# Patient Record
Sex: MALE | Race: OTHER | Employment: FULL TIME | URBAN - METROPOLITAN AREA
[De-identification: names, ages, dates, MRNs, and addresses within clinical notes are randomized per-mention and may not be internally consistent; named-entity substitution may affect disease eponyms.]

---

## 2017-07-27 ENCOUNTER — OFFICE VISIT (OUTPATIENT)
Dept: INTERNAL MEDICINE CLINIC | Age: 57
End: 2017-07-27

## 2017-07-27 VITALS
OXYGEN SATURATION: 97 % | SYSTOLIC BLOOD PRESSURE: 116 MMHG | RESPIRATION RATE: 16 BRPM | HEIGHT: 64 IN | BODY MASS INDEX: 39.27 KG/M2 | TEMPERATURE: 98.7 F | DIASTOLIC BLOOD PRESSURE: 80 MMHG | WEIGHT: 230 LBS | HEART RATE: 105 BPM

## 2017-07-27 DIAGNOSIS — K75.81 NASH (NONALCOHOLIC STEATOHEPATITIS): ICD-10-CM

## 2017-07-27 DIAGNOSIS — E78.2 MIXED HYPERLIPIDEMIA: ICD-10-CM

## 2017-07-27 DIAGNOSIS — M79.672 PAIN OF BOTH HEELS: ICD-10-CM

## 2017-07-27 DIAGNOSIS — Z13.9 SCREENING: ICD-10-CM

## 2017-07-27 DIAGNOSIS — Z23 NEED FOR TDAP VACCINATION: ICD-10-CM

## 2017-07-27 DIAGNOSIS — M72.2 PLANTAR FASCIITIS, BILATERAL: ICD-10-CM

## 2017-07-27 DIAGNOSIS — M79.671 PAIN OF BOTH HEELS: ICD-10-CM

## 2017-07-27 DIAGNOSIS — M32.9 LUPUS (SYSTEMIC LUPUS ERYTHEMATOSUS) (HCC): Primary | ICD-10-CM

## 2017-07-27 DIAGNOSIS — N52.9 ERECTILE DYSFUNCTION, UNSPECIFIED ERECTILE DYSFUNCTION TYPE: ICD-10-CM

## 2017-07-27 LAB
A/G RATIO: 1.3 (ref 1.1–2.2)
ALBUMIN SERPL-MCNC: 4.4 G/DL (ref 3.4–5)
ALP BLD-CCNC: 103 U/L (ref 40–129)
ALT SERPL-CCNC: 158 U/L (ref 10–40)
ANION GAP SERPL CALCULATED.3IONS-SCNC: 17 MMOL/L (ref 3–16)
AST SERPL-CCNC: 140 U/L (ref 15–37)
BILIRUB SERPL-MCNC: 1 MG/DL (ref 0–1)
BUN BLDV-MCNC: 22 MG/DL (ref 7–20)
CALCIUM SERPL-MCNC: 10.7 MG/DL (ref 8.3–10.6)
CHLORIDE BLD-SCNC: 99 MMOL/L (ref 99–110)
CHOLESTEROL, TOTAL: 200 MG/DL (ref 0–199)
CO2: 22 MMOL/L (ref 21–32)
CREAT SERPL-MCNC: 1.6 MG/DL (ref 0.9–1.3)
GFR AFRICAN AMERICAN: 54
GFR NON-AFRICAN AMERICAN: 45
GLOBULIN: 3.4 G/DL
GLUCOSE BLD-MCNC: 86 MG/DL (ref 70–99)
HDLC SERPL-MCNC: 32 MG/DL (ref 40–60)
HEPATITIS C ANTIBODY INTERPRETATION: NORMAL
LDL CHOLESTEROL CALCULATED: 140 MG/DL
POTASSIUM SERPL-SCNC: 4.7 MMOL/L (ref 3.5–5.1)
SODIUM BLD-SCNC: 138 MMOL/L (ref 136–145)
TOTAL PROTEIN: 7.8 G/DL (ref 6.4–8.2)
TRIGL SERPL-MCNC: 142 MG/DL (ref 0–150)
VLDLC SERPL CALC-MCNC: 28 MG/DL

## 2017-07-27 PROCEDURE — 90471 IMMUNIZATION ADMIN: CPT | Performed by: INTERNAL MEDICINE

## 2017-07-27 PROCEDURE — 90715 TDAP VACCINE 7 YRS/> IM: CPT | Performed by: INTERNAL MEDICINE

## 2017-07-27 PROCEDURE — 99214 OFFICE O/P EST MOD 30 MIN: CPT | Performed by: INTERNAL MEDICINE

## 2017-07-27 RX ORDER — SILDENAFIL 100 MG/1
100 TABLET, FILM COATED ORAL DAILY PRN
Qty: 15 TABLET | Refills: 11 | Status: SHIPPED | OUTPATIENT
Start: 2017-07-27

## 2017-07-27 ASSESSMENT — PATIENT HEALTH QUESTIONNAIRE - PHQ9
2. FEELING DOWN, DEPRESSED OR HOPELESS: 0
SUM OF ALL RESPONSES TO PHQ9 QUESTIONS 1 & 2: 0
1. LITTLE INTEREST OR PLEASURE IN DOING THINGS: 0
SUM OF ALL RESPONSES TO PHQ QUESTIONS 1-9: 0

## 2017-07-28 DIAGNOSIS — M32.9 LUPUS (SYSTEMIC LUPUS ERYTHEMATOSUS) (HCC): ICD-10-CM

## 2017-07-28 DIAGNOSIS — N17.9 ACUTE KIDNEY INJURY (HCC): Primary | ICD-10-CM

## 2017-07-28 LAB — HIV-1 AND HIV-2 ANTIBODIES: NORMAL

## 2017-08-08 ENCOUNTER — HOSPITAL ENCOUNTER (OUTPATIENT)
Dept: ULTRASOUND IMAGING | Age: 57
Discharge: HOME | End: 2017-08-08
Attending: INTERNAL MEDICINE | Admitting: INTERNAL MEDICINE

## 2017-08-08 DIAGNOSIS — N17.9 ACUTE RENAL FAILURE, UNSPECIFIED ACUTE RENAL FAILURE TYPE (HCC): ICD-10-CM

## 2017-08-08 DIAGNOSIS — N17.9 ACUTE KIDNEY FAILURE (HCC): ICD-10-CM

## 2021-03-25 ENCOUNTER — IMMUNIZATION (OUTPATIENT)
Dept: PRIMARY CARE CLINIC | Age: 61
End: 2021-03-25
Payer: COMMERCIAL

## 2021-03-25 PROCEDURE — 91301 COVID-19, MODERNA VACCINE 100MCG/0.5ML DOSE: CPT | Performed by: FAMILY MEDICINE

## 2021-03-25 PROCEDURE — 0011A PR IMM ADMN SARSCOV2 100 MCG/0.5 ML 1ST DOSE: CPT | Performed by: FAMILY MEDICINE

## 2021-04-22 ENCOUNTER — IMMUNIZATION (OUTPATIENT)
Dept: PRIMARY CARE CLINIC | Age: 61
End: 2021-04-22
Payer: COMMERCIAL

## 2021-04-22 PROCEDURE — 0012A PR IMM ADMN SARSCOV2 100 MCG/0.5 ML 2ND DOSE: CPT | Performed by: FAMILY MEDICINE

## 2021-04-22 PROCEDURE — 91301 COVID-19, MODERNA VACCINE 100MCG/0.5ML DOSE: CPT | Performed by: FAMILY MEDICINE

## 2024-12-23 SDOH — HEALTH STABILITY: PHYSICAL HEALTH
ON AVERAGE, HOW MANY DAYS PER WEEK DO YOU ENGAGE IN MODERATE TO STRENUOUS EXERCISE (LIKE A BRISK WALK)?: PATIENT DECLINED

## 2024-12-26 ENCOUNTER — OFFICE VISIT (OUTPATIENT)
Dept: FAMILY MEDICINE CLINIC | Age: 64
End: 2024-12-26
Payer: COMMERCIAL

## 2024-12-26 ENCOUNTER — HOSPITAL ENCOUNTER (OUTPATIENT)
Dept: CT IMAGING | Age: 64
Discharge: HOME | End: 2024-12-26
Payer: COMMERCIAL

## 2024-12-26 VITALS
SYSTOLIC BLOOD PRESSURE: 122 MMHG | HEIGHT: 64 IN | HEART RATE: 114 BPM | WEIGHT: 214.8 LBS | TEMPERATURE: 98.2 F | DIASTOLIC BLOOD PRESSURE: 80 MMHG | OXYGEN SATURATION: 98 % | BODY MASS INDEX: 36.67 KG/M2

## 2024-12-26 DIAGNOSIS — I81 PVT (PORTAL VEIN THROMBOSIS): ICD-10-CM

## 2024-12-26 DIAGNOSIS — K76.6 PORTAL VENOUS HYPERTENSION (HCC): ICD-10-CM

## 2024-12-26 DIAGNOSIS — R19.8 IRREGULAR BOWEL HABITS: ICD-10-CM

## 2024-12-26 DIAGNOSIS — E78.2 MIXED HYPERLIPIDEMIA: ICD-10-CM

## 2024-12-26 DIAGNOSIS — R14.0 ABDOMINAL BLOATING: ICD-10-CM

## 2024-12-26 DIAGNOSIS — M32.9 HISTORY OF SYSTEMIC LUPUS ERYTHEMATOSUS (SLE) (HCC): ICD-10-CM

## 2024-12-26 DIAGNOSIS — Z12.5 PROSTATE CANCER SCREENING: ICD-10-CM

## 2024-12-26 DIAGNOSIS — K75.81 NASH (NONALCOHOLIC STEATOHEPATITIS): ICD-10-CM

## 2024-12-26 DIAGNOSIS — R18.8 CIRRHOSIS OF LIVER WITH ASCITES, UNSPECIFIED HEPATIC CIRRHOSIS TYPE (HCC): Primary | ICD-10-CM

## 2024-12-26 DIAGNOSIS — R00.0 TACHYCARDIA: ICD-10-CM

## 2024-12-26 DIAGNOSIS — R10.84 GENERALIZED ABDOMINAL PAIN: ICD-10-CM

## 2024-12-26 DIAGNOSIS — R63.4 UNINTENDED WEIGHT LOSS: ICD-10-CM

## 2024-12-26 DIAGNOSIS — K92.1 BLACK STOOLS: ICD-10-CM

## 2024-12-26 DIAGNOSIS — K74.60 CIRRHOSIS OF LIVER WITH ASCITES, UNSPECIFIED HEPATIC CIRRHOSIS TYPE (HCC): Primary | ICD-10-CM

## 2024-12-26 DIAGNOSIS — R10.84 GENERALIZED ABDOMINAL PAIN: Primary | ICD-10-CM

## 2024-12-26 LAB
ALBUMIN SERPL-MCNC: 3 G/DL (ref 3.4–5)
ALBUMIN/GLOB SERPL: 0.8 {RATIO} (ref 1.1–2.2)
ALP SERPL-CCNC: 758 U/L (ref 40–129)
ALT SERPL-CCNC: 108 U/L (ref 10–40)
ANION GAP SERPL CALCULATED.3IONS-SCNC: 11 MMOL/L (ref 3–16)
APTT BLD: 34 SEC (ref 22.1–36.4)
AST SERPL-CCNC: 228 U/L (ref 15–37)
BASOPHILS # BLD: 0.1 K/UL (ref 0–0.2)
BASOPHILS NFR BLD: 1.3 %
BILIRUB SERPL-MCNC: 3.7 MG/DL (ref 0–1)
BUN SERPL-MCNC: 22 MG/DL (ref 7–20)
CALCIUM SERPL-MCNC: 9.8 MG/DL (ref 8.3–10.6)
CHLORIDE SERPL-SCNC: 104 MMOL/L (ref 99–110)
CHOLEST SERPL-MCNC: 194 MG/DL (ref 0–199)
CO2 SERPL-SCNC: 24 MMOL/L (ref 21–32)
CREAT SERPL-MCNC: 0.9 MG/DL (ref 0.8–1.3)
CRP SERPL-MCNC: 21.3 MG/L (ref 0–5.1)
DEPRECATED RDW RBC AUTO: 17.8 % (ref 12.4–15.4)
EOSINOPHIL # BLD: 0.1 K/UL (ref 0–0.6)
EOSINOPHIL NFR BLD: 1.9 %
ERYTHROCYTE [SEDIMENTATION RATE] IN BLOOD BY WESTERGREN METHOD: 96 MM/HR (ref 0–20)
EST. AVERAGE GLUCOSE BLD GHB EST-MCNC: 111.2 MG/DL
GFR SERPLBLD CREATININE-BSD FMLA CKD-EPI: >90 ML/MIN/{1.73_M2}
GLUCOSE SERPL-MCNC: 123 MG/DL (ref 70–99)
HBA1C MFR BLD: 5.5 %
HCT VFR BLD AUTO: 36.9 % (ref 40.5–52.5)
HDLC SERPL-MCNC: 17 MG/DL (ref 40–60)
HGB BLD-MCNC: 12.4 G/DL (ref 13.5–17.5)
INR PPP: 1.21 (ref 0.85–1.15)
LDLC SERPL CALC-MCNC: 145 MG/DL
LYMPHOCYTES # BLD: 1.5 K/UL (ref 1–5.1)
LYMPHOCYTES NFR BLD: 22.3 %
MCH RBC QN AUTO: 31.3 PG (ref 26–34)
MCHC RBC AUTO-ENTMCNC: 33.5 G/DL (ref 31–36)
MCV RBC AUTO: 93.4 FL (ref 80–100)
MONOCYTES # BLD: 0.5 K/UL (ref 0–1.3)
MONOCYTES NFR BLD: 6.9 %
NEUTROPHILS # BLD: 4.6 K/UL (ref 1.7–7.7)
NEUTROPHILS NFR BLD: 67.6 %
PERFORMED ON: NORMAL
PLATELET # BLD AUTO: 247 K/UL (ref 135–450)
PMV BLD AUTO: 9.9 FL (ref 5–10.5)
POC CREATININE: 1 MG/DL (ref 0.8–1.3)
POC SAMPLE TYPE: NORMAL
POTASSIUM SERPL-SCNC: 4.8 MMOL/L (ref 3.5–5.1)
PROT SERPL-MCNC: 7 G/DL (ref 6.4–8.2)
PROTHROMBIN TIME: 15.5 SEC (ref 11.9–14.9)
PSA SERPL DL<=0.01 NG/ML-MCNC: 4.12 NG/ML (ref 0–4)
RBC # BLD AUTO: 3.95 M/UL (ref 4.2–5.9)
SODIUM SERPL-SCNC: 139 MMOL/L (ref 136–145)
TRIGL SERPL-MCNC: 160 MG/DL (ref 0–150)
TSH SERPL DL<=0.005 MIU/L-ACNC: 2.78 UIU/ML (ref 0.27–4.2)
VLDLC SERPL CALC-MCNC: 32 MG/DL
WBC # BLD AUTO: 6.9 K/UL (ref 4–11)

## 2024-12-26 PROCEDURE — 99204 OFFICE O/P NEW MOD 45 MIN: CPT | Performed by: FAMILY MEDICINE

## 2024-12-26 PROCEDURE — 6360000004 HC RX CONTRAST MEDICATION: Performed by: FAMILY MEDICINE

## 2024-12-26 PROCEDURE — 82565 ASSAY OF CREATININE: CPT

## 2024-12-26 PROCEDURE — 93000 ELECTROCARDIOGRAM COMPLETE: CPT | Performed by: FAMILY MEDICINE

## 2024-12-26 PROCEDURE — 74177 CT ABD & PELVIS W/CONTRAST: CPT

## 2024-12-26 RX ORDER — IOPAMIDOL 755 MG/ML
75 INJECTION, SOLUTION INTRAVASCULAR
Status: COMPLETED | OUTPATIENT
Start: 2024-12-26 | End: 2024-12-26

## 2024-12-26 RX ADMIN — IOPAMIDOL 75 ML: 755 INJECTION, SOLUTION INTRAVENOUS at 11:02

## 2024-12-26 ASSESSMENT — PATIENT HEALTH QUESTIONNAIRE - PHQ9
9. THOUGHTS THAT YOU WOULD BE BETTER OFF DEAD, OR OF HURTING YOURSELF: NOT AT ALL
5. POOR APPETITE OR OVEREATING: NEARLY EVERY DAY
4. FEELING TIRED OR HAVING LITTLE ENERGY: SEVERAL DAYS
SUM OF ALL RESPONSES TO PHQ9 QUESTIONS 1 & 2: 2
SUM OF ALL RESPONSES TO PHQ QUESTIONS 1-9: 9
3. TROUBLE FALLING OR STAYING ASLEEP: MORE THAN HALF THE DAYS
SUM OF ALL RESPONSES TO PHQ QUESTIONS 1-9: 9
8. MOVING OR SPEAKING SO SLOWLY THAT OTHER PEOPLE COULD HAVE NOTICED. OR THE OPPOSITE, BEING SO FIGETY OR RESTLESS THAT YOU HAVE BEEN MOVING AROUND A LOT MORE THAN USUAL: NOT AT ALL
SUM OF ALL RESPONSES TO PHQ QUESTIONS 1-9: 9
SUM OF ALL RESPONSES TO PHQ QUESTIONS 1-9: 9
7. TROUBLE CONCENTRATING ON THINGS, SUCH AS READING THE NEWSPAPER OR WATCHING TELEVISION: NOT AT ALL
2. FEELING DOWN, DEPRESSED OR HOPELESS: SEVERAL DAYS
10. IF YOU CHECKED OFF ANY PROBLEMS, HOW DIFFICULT HAVE THESE PROBLEMS MADE IT FOR YOU TO DO YOUR WORK, TAKE CARE OF THINGS AT HOME, OR GET ALONG WITH OTHER PEOPLE: SOMEWHAT DIFFICULT
6. FEELING BAD ABOUT YOURSELF - OR THAT YOU ARE A FAILURE OR HAVE LET YOURSELF OR YOUR FAMILY DOWN: SEVERAL DAYS
1. LITTLE INTEREST OR PLEASURE IN DOING THINGS: SEVERAL DAYS

## 2024-12-26 ASSESSMENT — ANXIETY QUESTIONNAIRES
5. BEING SO RESTLESS THAT IT IS HARD TO SIT STILL: SEVERAL DAYS
4. TROUBLE RELAXING: SEVERAL DAYS
6. BECOMING EASILY ANNOYED OR IRRITABLE: SEVERAL DAYS
7. FEELING AFRAID AS IF SOMETHING AWFUL MIGHT HAPPEN: NOT AT ALL
GAD7 TOTAL SCORE: 6
IF YOU CHECKED OFF ANY PROBLEMS ON THIS QUESTIONNAIRE, HOW DIFFICULT HAVE THESE PROBLEMS MADE IT FOR YOU TO DO YOUR WORK, TAKE CARE OF THINGS AT HOME, OR GET ALONG WITH OTHER PEOPLE: SOMEWHAT DIFFICULT
2. NOT BEING ABLE TO STOP OR CONTROL WORRYING: SEVERAL DAYS
3. WORRYING TOO MUCH ABOUT DIFFERENT THINGS: SEVERAL DAYS
1. FEELING NERVOUS, ANXIOUS, OR ON EDGE: SEVERAL DAYS

## 2024-12-26 NOTE — PROGRESS NOTES
[facility]  Date of Encounter: 2024     Geraldo Teresa (: 1960) is a 64 y.o. male who presents today for:   Chief Complaint   Patient presents with    New Patient    Establish Care     Pt relocated from the [place] [facility] to the [place] [facility] and needed to establish care     Establishing care today.    About 2-3 months ago (mid October)- developed irregular bowel movements (potent smell and dark color) and abdominal discomfort. Then developed bloating which has been more persistent. Has had to cut back on when and how much he is eating. BMs have become more regular (BM every 2-3 days without straining), although did have 2 days of black stools about 1 month ago.   Over the last week with waxing and waning LE swelling, especially to feet.   Does not feel like urination volume matches with intake, still urinating without other urinary symptoms.     Weight 231 lbs prior to onset of symptoms, now 215 lbs.     SLE dx in - was prescribed plaquenil but has not had symptoms since then and has not taken any medication for SLE for 30+ years (only took for about 2 years).     Occasionally taking tylenol prn.     Has noticed some apathy and anhedonia related to abdominal bloating and discomfort limiting his activity.  Mild sensation of SOB, feels like he cannot take a deep breath with bloating.   ROS otherwise negative.           2024     9:54 AM 2017     3:29 PM 3/18/2015     4:22 PM   PHQ-9    Little interest or pleasure in doing things 1 0 0   Feeling down, depressed, or hopeless 1 0 0   Trouble falling or staying asleep, or sleeping too much 2     Feeling tired or having little energy 1     Poor appetite or overeating 3     Feeling bad about yourself - or that you are a failure or have let yourself or your family down 1     Trouble concentrating on things, such as reading the newspaper or watching television 0     Moving or speaking so slowly that other people

## 2024-12-27 LAB
ANA SER QL IA: NEGATIVE
BACTERIA URNS QL MICRO: ABNORMAL /HPF
BILIRUB UR QL STRIP.AUTO: ABNORMAL
CLARITY UR: ABNORMAL
COLOR UR: ABNORMAL
CREAT UR-MCNC: 445 MG/DL (ref 39–259)
CRYSTALS URNS MICRO: ABNORMAL /HPF
DSDNA AB SER-ACNC: <1 IU/ML (ref 0–9)
EPI CELLS #/AREA URNS HPF: ABNORMAL /HPF (ref 0–5)
GLUCOSE UR STRIP.AUTO-MCNC: ABNORMAL MG/DL
HGB UR QL STRIP.AUTO: ABNORMAL
HYALINE CASTS #/AREA URNS LPF: ABNORMAL /LPF (ref 0–2)
KETONES UR STRIP.AUTO-MCNC: ABNORMAL MG/DL
LEUKOCYTE ESTERASE UR QL STRIP.AUTO: ABNORMAL
MICROALBUMIN UR DL<=1MG/L-MCNC: 4.32 MG/DL
MICROALBUMIN/CREAT UR: 9.7 MG/G (ref 0–30)
NITRITE UR QL STRIP.AUTO: ABNORMAL
PH UR STRIP.AUTO: ABNORMAL [PH] (ref 5–8)
PROT UR STRIP.AUTO-MCNC: ABNORMAL MG/DL
RBC #/AREA URNS HPF: ABNORMAL /HPF (ref 0–4)
SP GR UR STRIP.AUTO: ABNORMAL (ref 1–1.03)
UA DIPSTICK W REFLEX MICRO PNL UR: YES
URN SPEC COLLECT METH UR: ABNORMAL
UROBILINOGEN UR STRIP-ACNC: ABNORMAL E.U./DL
WBC #/AREA URNS HPF: ABNORMAL /HPF (ref 0–5)

## 2024-12-30 DIAGNOSIS — R18.8 CIRRHOSIS OF LIVER WITH ASCITES, UNSPECIFIED HEPATIC CIRRHOSIS TYPE (HCC): Primary | ICD-10-CM

## 2024-12-30 DIAGNOSIS — K74.60 CIRRHOSIS OF LIVER WITH ASCITES, UNSPECIFIED HEPATIC CIRRHOSIS TYPE (HCC): Primary | ICD-10-CM

## 2024-12-30 RX ORDER — FUROSEMIDE 20 MG/1
20 TABLET ORAL DAILY
Qty: 30 TABLET | Refills: 1 | Status: SHIPPED | OUTPATIENT
Start: 2024-12-30

## 2024-12-30 RX ORDER — SPIRONOLACTONE 50 MG/1
50 TABLET, FILM COATED ORAL DAILY
Qty: 30 TABLET | Refills: 1 | Status: SHIPPED | OUTPATIENT
Start: 2024-12-30

## 2024-12-31 DIAGNOSIS — R18.8 CIRRHOSIS OF LIVER WITH ASCITES, UNSPECIFIED HEPATIC CIRRHOSIS TYPE (HCC): ICD-10-CM

## 2024-12-31 DIAGNOSIS — K74.60 CIRRHOSIS OF LIVER WITH ASCITES, UNSPECIFIED HEPATIC CIRRHOSIS TYPE (HCC): ICD-10-CM

## 2024-12-31 LAB
FERRITIN SERPL IA-MCNC: 501 NG/ML (ref 30–400)
GGT SERPL-CCNC: 761 U/L (ref 8–61)
HAV IGM SERPL QL IA: NORMAL
HBV CORE IGM SERPL QL IA: NORMAL
HBV SURFACE AB SERPL IA-ACNC: 22.9 MIU/ML
HBV SURFACE AG SERPL QL IA: NORMAL
HCV AB SERPL QL IA: NORMAL
IRON SATN MFR SERPL: 43 % (ref 20–50)
IRON SERPL-MCNC: 60 UG/DL (ref 59–158)
TIBC SERPL-MCNC: 140 UG/DL (ref 260–445)

## 2025-01-02 LAB
HBV CORE AB SERPL QL IA: NEGATIVE
LKM-1 IGG SER IA-ACNC: 1.6 U (ref 0–24.9)

## 2025-01-03 LAB
AFP-TM SERPL-MCNC: <1.8 UG/L
SMA IGG SER-ACNC: 16 UNITS (ref 0–19)

## 2025-01-04 LAB — CERULOPLASMIN SERPL-MCNC: 36 MG/DL (ref 15–30)

## 2025-01-06 LAB — MITOCHONDRIA M2 AB SER IA-ACNC: 2.3 U/ML (ref 0–4)

## 2025-01-06 SDOH — ECONOMIC STABILITY: FOOD INSECURITY: WITHIN THE PAST 12 MONTHS, THE FOOD YOU BOUGHT JUST DIDN'T LAST AND YOU DIDN'T HAVE MONEY TO GET MORE.: NEVER TRUE

## 2025-01-06 SDOH — ECONOMIC STABILITY: TRANSPORTATION INSECURITY
IN THE PAST 12 MONTHS, HAS LACK OF TRANSPORTATION KEPT YOU FROM MEETINGS, WORK, OR FROM GETTING THINGS NEEDED FOR DAILY LIVING?: NO

## 2025-01-06 SDOH — ECONOMIC STABILITY: FOOD INSECURITY: WITHIN THE PAST 12 MONTHS, YOU WORRIED THAT YOUR FOOD WOULD RUN OUT BEFORE YOU GOT MONEY TO BUY MORE.: NEVER TRUE

## 2025-01-06 SDOH — ECONOMIC STABILITY: INCOME INSECURITY: HOW HARD IS IT FOR YOU TO PAY FOR THE VERY BASICS LIKE FOOD, HOUSING, MEDICAL CARE, AND HEATING?: NOT HARD AT ALL

## 2025-01-06 ASSESSMENT — PATIENT HEALTH QUESTIONNAIRE - PHQ9
SUM OF ALL RESPONSES TO PHQ9 QUESTIONS 1 & 2: 0
1. LITTLE INTEREST OR PLEASURE IN DOING THINGS: NOT AT ALL
SUM OF ALL RESPONSES TO PHQ QUESTIONS 1-9: 0
1. LITTLE INTEREST OR PLEASURE IN DOING THINGS: NOT AT ALL
SUM OF ALL RESPONSES TO PHQ9 QUESTIONS 1 & 2: 0
2. FEELING DOWN, DEPRESSED OR HOPELESS: NOT AT ALL
SUM OF ALL RESPONSES TO PHQ QUESTIONS 1-9: 0
2. FEELING DOWN, DEPRESSED OR HOPELESS: NOT AT ALL
SUM OF ALL RESPONSES TO PHQ QUESTIONS 1-9: 0
SUM OF ALL RESPONSES TO PHQ QUESTIONS 1-9: 0

## 2025-01-08 ENCOUNTER — HOSPITAL ENCOUNTER (OUTPATIENT)
Dept: MRI IMAGING | Age: 65
Discharge: HOME | End: 2025-01-08
Attending: FAMILY MEDICINE
Payer: COMMERCIAL

## 2025-01-08 DIAGNOSIS — K74.60 CIRRHOSIS OF LIVER WITH ASCITES, UNSPECIFIED HEPATIC CIRRHOSIS TYPE (HCC): ICD-10-CM

## 2025-01-08 DIAGNOSIS — R18.8 CIRRHOSIS OF LIVER WITH ASCITES, UNSPECIFIED HEPATIC CIRRHOSIS TYPE (HCC): ICD-10-CM

## 2025-01-08 PROCEDURE — 74183 MRI ABD W/O CNTR FLWD CNTR: CPT

## 2025-01-08 PROCEDURE — A9581 GADOXETATE DISODIUM INJ: HCPCS | Performed by: FAMILY MEDICINE

## 2025-01-08 PROCEDURE — 6360000004 HC RX CONTRAST MEDICATION: Performed by: FAMILY MEDICINE

## 2025-01-08 RX ADMIN — GADOXETATE DISODIUM 10 ML: 181.43 INJECTION, SOLUTION INTRAVENOUS at 15:12

## 2025-01-09 ENCOUNTER — OFFICE VISIT (OUTPATIENT)
Dept: FAMILY MEDICINE CLINIC | Age: 65
End: 2025-01-09
Payer: COMMERCIAL

## 2025-01-09 VITALS
WEIGHT: 216.4 LBS | HEIGHT: 64 IN | OXYGEN SATURATION: 99 % | DIASTOLIC BLOOD PRESSURE: 70 MMHG | HEART RATE: 100 BPM | BODY MASS INDEX: 36.95 KG/M2 | SYSTOLIC BLOOD PRESSURE: 120 MMHG | TEMPERATURE: 98.2 F

## 2025-01-09 DIAGNOSIS — I81 PVT (PORTAL VEIN THROMBOSIS): ICD-10-CM

## 2025-01-09 DIAGNOSIS — R18.8 CIRRHOSIS OF LIVER WITH ASCITES, UNSPECIFIED HEPATIC CIRRHOSIS TYPE (HCC): ICD-10-CM

## 2025-01-09 DIAGNOSIS — K74.60 CIRRHOSIS OF LIVER WITH ASCITES, UNSPECIFIED HEPATIC CIRRHOSIS TYPE (HCC): ICD-10-CM

## 2025-01-09 DIAGNOSIS — K76.6 PORTAL VENOUS HYPERTENSION (HCC): ICD-10-CM

## 2025-01-09 DIAGNOSIS — C22.0 HEPATOCELLULAR CARCINOMA (HCC): Primary | ICD-10-CM

## 2025-01-09 LAB
ANION GAP SERPL CALCULATED.3IONS-SCNC: 11 MMOL/L (ref 3–16)
BUN SERPL-MCNC: 24 MG/DL (ref 7–20)
CALCIUM SERPL-MCNC: 10.2 MG/DL (ref 8.3–10.6)
CHLORIDE SERPL-SCNC: 95 MMOL/L (ref 99–110)
CO2 SERPL-SCNC: 24 MMOL/L (ref 21–32)
CREAT SERPL-MCNC: 1 MG/DL (ref 0.8–1.3)
GFR SERPLBLD CREATININE-BSD FMLA CKD-EPI: 84 ML/MIN/{1.73_M2}
GLUCOSE SERPL-MCNC: 90 MG/DL (ref 70–99)
POTASSIUM SERPL-SCNC: 5.2 MMOL/L (ref 3.5–5.1)
SODIUM SERPL-SCNC: 130 MMOL/L (ref 136–145)

## 2025-01-09 PROCEDURE — 99214 OFFICE O/P EST MOD 30 MIN: CPT | Performed by: FAMILY MEDICINE

## 2025-01-09 RX ORDER — SPIRONOLACTONE 100 MG/1
100 TABLET, FILM COATED ORAL DAILY
Qty: 30 TABLET | Refills: 0 | Status: SHIPPED | OUTPATIENT
Start: 2025-01-09

## 2025-01-09 RX ORDER — FUROSEMIDE 40 MG/1
40 TABLET ORAL DAILY
Qty: 30 TABLET | Refills: 0 | Status: SHIPPED | OUTPATIENT
Start: 2025-01-09

## 2025-01-09 NOTE — PROGRESS NOTES
Patient to call if unable to get in with oncology within 1 week.     All pertinent side effects, risks, benefits and precautions of medication(s) prescribed during this visit were discussed in detail. Patient understands and agrees with above treatment plan.     Objective   /70 (Site: Right Upper Arm, Position: Sitting, Cuff Size: Medium Adult)   Pulse 100   Temp 98.2 °F (36.8 °C) (Oral)   Ht 1.626 m (5' 4\")   Wt 98.2 kg (216 lb 6.4 oz)   SpO2 99%   BMI 37.14 kg/m²    Physical Exam  Constitutional:       General: He is not in acute distress.  HENT:      Head: Normocephalic and atraumatic.   Eyes:      Extraocular Movements: Extraocular movements intact.      Conjunctiva/sclera: Conjunctivae normal.      Pupils: Pupils are equal, round, and reactive to light.   Neck:      Thyroid: No thyroid mass or thyromegaly.   Cardiovascular:      Rate and Rhythm: Normal rate and regular rhythm.      Pulses: Normal pulses.      Heart sounds: No murmur heard.  Pulmonary:      Effort: Pulmonary effort is normal. No respiratory distress.      Breath sounds: No wheezing, rhonchi or rales.   Abdominal:      General: Bowel sounds are normal. There is distension.      Tenderness: There is no abdominal tenderness. There is no guarding or rebound.   Musculoskeletal:         General: Normal range of motion.      Comments: 1-2+ pitting edema to bilateral LEs   Lymphadenopathy:      Cervical: No cervical adenopathy.   Skin:     General: Skin is warm and dry.      Capillary Refill: Capillary refill takes less than 2 seconds.      Findings: No rash.   Neurological:      General: No focal deficit present.      Mental Status: He is alert. Mental status is at baseline.      Cranial Nerves: No cranial nerve deficit.      Motor: No weakness.      Gait: Gait normal.   Psychiatric:         Mood and Affect: Mood and affect normal.         Speech: Speech normal.         Behavior: Behavior normal.         Thought Content: Thought content

## 2025-01-13 DIAGNOSIS — E87.1 HYPONATREMIA: Primary | ICD-10-CM

## 2025-01-13 DIAGNOSIS — E87.5 HYPERKALEMIA: ICD-10-CM

## 2025-01-14 ENCOUNTER — TELEPHONE (OUTPATIENT)
Dept: INTERVENTIONAL RADIOLOGY/VASCULAR | Age: 65
End: 2025-01-14

## 2025-01-14 DIAGNOSIS — E87.5 HYPERKALEMIA: ICD-10-CM

## 2025-01-14 DIAGNOSIS — E87.1 HYPONATREMIA: ICD-10-CM

## 2025-01-14 NOTE — TELEPHONE ENCOUNTER
Called and spoke to Geraldo about upcoming procedure. Phone number used: [phone]  Procedure: liver biopsy  Approving Radiologist: N/A    Pt informed of the following:  Date of procedure: 1/16/25  Arrival time of procedure: 0800  Time of procedure: 0930    On any blood thinners?No. If yes:     On any GLP-1 Agonists? ( Ozempic, Jardiance, Semaglutide) No.     Blood pressure medication? No. If yes need to make sure take morning of procedure.     Any issues with sedation in the past? no  Will receive versed and fentanyl for sedation will need a  day of procedure.     H&P or office note within 30 days? yes - 1/13/25    After procedure will be here 2-4 hours after procedure for monitoring.     Nothing to eat or drink at midnight.     Need COVID testing prior: No    Need SDS: Yes

## 2025-01-15 LAB
ANION GAP SERPL CALCULATED.3IONS-SCNC: 13 MMOL/L (ref 3–16)
BUN SERPL-MCNC: 25 MG/DL (ref 7–20)
CALCIUM SERPL-MCNC: 9.7 MG/DL (ref 8.3–10.6)
CHLORIDE SERPL-SCNC: 93 MMOL/L (ref 99–110)
CO2 SERPL-SCNC: 24 MMOL/L (ref 21–32)
CREAT SERPL-MCNC: 1.1 MG/DL (ref 0.8–1.3)
GFR SERPLBLD CREATININE-BSD FMLA CKD-EPI: 75 ML/MIN/{1.73_M2}
GLUCOSE SERPL-MCNC: 79 MG/DL (ref 70–99)
POTASSIUM SERPL-SCNC: 5.3 MMOL/L (ref 3.5–5.1)
SODIUM SERPL-SCNC: 130 MMOL/L (ref 136–145)

## 2025-01-16 ENCOUNTER — HOSPITAL ENCOUNTER (OUTPATIENT)
Dept: INTERVENTIONAL RADIOLOGY/VASCULAR | Age: 65
Discharge: HOME | End: 2025-01-16
Attending: INTERNAL MEDICINE
Payer: COMMERCIAL

## 2025-01-16 VITALS
TEMPERATURE: 98.5 F | DIASTOLIC BLOOD PRESSURE: 72 MMHG | OXYGEN SATURATION: 95 % | HEART RATE: 94 BPM | RESPIRATION RATE: 16 BRPM | SYSTOLIC BLOOD PRESSURE: 109 MMHG

## 2025-01-16 DIAGNOSIS — K74.60 CHRONIC LIVER DISEASE AND CIRRHOSIS (HCC): ICD-10-CM

## 2025-01-16 DIAGNOSIS — K76.9 CHRONIC LIVER DISEASE AND CIRRHOSIS (HCC): ICD-10-CM

## 2025-01-16 DIAGNOSIS — R18.8 ASCITES OF LIVER: ICD-10-CM

## 2025-01-16 LAB
ANION GAP SERPL CALCULATED.3IONS-SCNC: 11 MMOL/L (ref 3–16)
BUN SERPL-MCNC: 26 MG/DL (ref 7–20)
CALCIUM SERPL-MCNC: 9.2 MG/DL (ref 8.3–10.6)
CHLORIDE SERPL-SCNC: 95 MMOL/L (ref 99–110)
CO2 SERPL-SCNC: 26 MMOL/L (ref 21–32)
CREAT SERPL-MCNC: 1.4 MG/DL (ref 0.8–1.3)
DEPRECATED RDW RBC AUTO: 18.3 % (ref 12.4–15.4)
GFR SERPLBLD CREATININE-BSD FMLA CKD-EPI: 56 ML/MIN/{1.73_M2}
GLUCOSE SERPL-MCNC: 100 MG/DL (ref 70–99)
HCT VFR BLD AUTO: 35.4 % (ref 40.5–52.5)
HGB BLD-MCNC: 12.4 G/DL (ref 13.5–17.5)
INR PPP: 1.13 (ref 0.85–1.15)
MCH RBC QN AUTO: 31.7 PG (ref 26–34)
MCHC RBC AUTO-ENTMCNC: 35 G/DL (ref 31–36)
MCV RBC AUTO: 90.7 FL (ref 80–100)
PLATELET # BLD AUTO: 307 K/UL (ref 135–450)
PMV BLD AUTO: 7.7 FL (ref 5–10.5)
POTASSIUM SERPL-SCNC: 5.4 MMOL/L (ref 3.5–5.1)
PROTHROMBIN TIME: 14.7 SEC (ref 11.9–14.9)
RBC # BLD AUTO: 3.9 M/UL (ref 4.2–5.9)
SODIUM SERPL-SCNC: 132 MMOL/L (ref 136–145)
WBC # BLD AUTO: 7.6 K/UL (ref 4–11)

## 2025-01-16 PROCEDURE — 47000 NEEDLE BIOPSY OF LIVER PERQ: CPT

## 2025-01-16 PROCEDURE — 88305 TISSUE EXAM BY PATHOLOGIST: CPT

## 2025-01-16 PROCEDURE — 85027 COMPLETE CBC AUTOMATED: CPT

## 2025-01-16 PROCEDURE — 80048 BASIC METABOLIC PNL TOTAL CA: CPT

## 2025-01-16 PROCEDURE — 6360000002 HC RX W HCPCS: Performed by: RADIOLOGY

## 2025-01-16 PROCEDURE — 2709999900 IR BIOPSY LIVER PERCUTANEOUS

## 2025-01-16 PROCEDURE — 49083 ABD PARACENTESIS W/IMAGING: CPT

## 2025-01-16 PROCEDURE — 85610 PROTHROMBIN TIME: CPT

## 2025-01-16 PROCEDURE — 88112 CYTOPATH CELL ENHANCE TECH: CPT

## 2025-01-16 PROCEDURE — 76942 ECHO GUIDE FOR BIOPSY: CPT

## 2025-01-16 PROCEDURE — 99152 MOD SED SAME PHYS/QHP 5/>YRS: CPT

## 2025-01-16 RX ORDER — SODIUM CHLORIDE 0.9 % (FLUSH) 0.9 %
5-40 SYRINGE (ML) INJECTION EVERY 12 HOURS SCHEDULED
Status: DISCONTINUED | OUTPATIENT
Start: 2025-01-16 | End: 2025-01-17 | Stop reason: HOSPADM

## 2025-01-16 RX ORDER — MIDAZOLAM HYDROCHLORIDE 1 MG/ML
INJECTION, SOLUTION INTRAMUSCULAR; INTRAVENOUS PRN
Status: COMPLETED | OUTPATIENT
Start: 2025-01-16 | End: 2025-01-16

## 2025-01-16 RX ORDER — SODIUM CHLORIDE 9 MG/ML
INJECTION, SOLUTION INTRAVENOUS PRN
Status: DISCONTINUED | OUTPATIENT
Start: 2025-01-16 | End: 2025-01-17 | Stop reason: HOSPADM

## 2025-01-16 RX ORDER — FENTANYL CITRATE 0.05 MG/ML
INJECTION, SOLUTION INTRAMUSCULAR; INTRAVENOUS PRN
Status: COMPLETED | OUTPATIENT
Start: 2025-01-16 | End: 2025-01-16

## 2025-01-16 RX ORDER — SODIUM CHLORIDE 0.9 % (FLUSH) 0.9 %
5-40 SYRINGE (ML) INJECTION PRN
Status: DISCONTINUED | OUTPATIENT
Start: 2025-01-16 | End: 2025-01-17 | Stop reason: HOSPADM

## 2025-01-16 RX ADMIN — FENTANYL CITRATE 50 MCG: 50 INJECTION INTRAMUSCULAR; INTRAVENOUS at 09:44

## 2025-01-16 RX ADMIN — MIDAZOLAM 0.5 MG: 1 INJECTION INTRAMUSCULAR; INTRAVENOUS at 09:44

## 2025-01-16 NOTE — H&P
Patient:  Geraldo Teresa   :   1960      Relevant clinical history, particularly as it involves the pending procedure, was reviewed and discussed.    The procedure including risks and benefits was discussed at length with the patient (or designated family member) and all questions were answered.  Informed consent to proceed with the procedure was given.    Vital signs were monitored and documented by the Radiology nurse.    Targeted physical examination  Heart : regular rate and rhythm  Lungs : clear, breathing easily  Condition : stable    [facility] nurses notes reviewed and agreed.    Past Medical History:        Diagnosis Date    Combined hyperlipidemia     MCKEON (nonalcoholic steatohepatitis)     bx proven,neg serology     MCKEON (nonalcoholic steatohepatitis)     SLE (systemic lupus erythematosus) ([facility])      flared w arthralgias pleuritis     Systemic lupus ([facility])        Past Surgical History:     No past surgical history on file.    Allergies:  Patient has no known allergies.    Medications:   Home Meds  Current Outpatient Medications on File Prior to Encounter   Medication Sig Dispense Refill    furosemide (LASIX) 40 MG tablet Take 1 tablet by mouth daily 30 tablet 0    spironolactone (ALDACTONE) 100 MG tablet Take 1 tablet by mouth daily 30 tablet 0     No current facility-administered medications on file prior to encounter.       Current Meds  sodium chloride flush 0.9 % injection 5-40 mL, 2 times per day  sodium chloride flush 0.9 % injection 5-40 mL, PRN  0.9 % sodium chloride infusion, PRN          ASA 2 - Patient with mild systemic disease with no functional limitations    II (soft palate, uvula, fauces visible)    Activity:  2 - Able to move 4 extremities voluntarily on command  Respiration:  2 - Able to breathe deeply and cough freely  Circulation:  2 - BP+/- 20mmHg of normal  Consciousness:  2 - Fully awake  Oxygen Saturation (color):  2 - Able to maintain oxygen saturation >92% on room

## 2025-01-16 NOTE — OR NURSING
Image guided Paracentesis completed.  2400 liters of thin yellow colored fluid withdrawn.  Pt tolerated procedure without any signs or symptoms of distress. Vital signs stable.     DISCHARGED:  To [facility]    SPECIMEN SENT:  Yes    Vital Signs  Vitals:    01/16/25 0955   BP: 119/67   Pulse: 91   Resp: 15   Temp:    SpO2: 98%    (vital signs in table format)

## 2025-01-16 NOTE — PROCEDURES
PROCEDURE NOTE  Date: 1/16/2025   Name: Geraldo Teresa  YOB: 1960    Procedures      IR Brief Postoperative Note    Geraldo Teresa  YOB: 1960  [id number]    Pre-operative Diagnosis: cirrhosis, possible lesion    Post-operative Diagnosis: Same    Procedure: Liver bx; paracentesis    Anesthesia: mod    Surgeons/Assistants: david    Estimated Blood Loss: Minimal    Complications: none    Specimens: were obtained. Liver core bx. 1200 cc ascites sent for cytology as well    See full procedure dictation to follow      Prasad Rain MD MD  1/16/2025

## 2025-01-16 NOTE — PROGRESS NOTES
Patient admitted to [facility] Rm 5 in preparation for procedure, VSS. Consent per IR. IV inserted into Lt forearm, NS locked. Belongings in bag. NPO since 2130.

## 2025-01-16 NOTE — OR NURSING
Pt arrived for image guided liver biopsy right. Procedure explained including the risk and benefits of the procedure. All questions answered. Pt verbalizes understanding of the of procedure and states no more questions. Consent signed. Vital signs stable, labs, allergies, medications, and code status reviewed. No contraindications noted.     Vitals:    01/16/25 0750   BP: 115/78   Pulse: 99   Resp: 20   Temp: 97.8 °F (36.6 °C)   SpO2: 95%    (vital signs in table format)    Bony Score  2 - Able to move 4 extremities voluntarily on command  2 - BP+/- 20mmHg of normal  2 - Fully awake  2 - Able to maintain oxygen saturation >92% on room air  2 - Able to breathe deeply and cough freely    Allergies  Patient has no known allergies.    Labs  Lab Results   Component Value Date    INR 1.13 01/16/2025    PROTIME 14.7 01/16/2025       Lab Results   Component Value Date    CREATININE 1.4 (H) 01/16/2025    BUN 26 (H) 01/16/2025     (L) 01/16/2025    K 5.4 (H) 01/16/2025    CL 95 (L) 01/16/2025    CO2 26 01/16/2025       Lab Results   Component Value Date    WBC 7.6 01/16/2025    HGB 12.4 (L) 01/16/2025    HCT 35.4 (L) 01/16/2025    MCV 90.7 01/16/2025     01/16/2025     Pt arrived for image guided Paracentesis. Dr. Rain explained the procedure including the risk and benefits of the procedure. All questions answered. Pt verbalizes understanding of the procedure and states no more questions. Consent confirmed. Vital signs stable. Labs, allergies, medications, and code status reviewed. No contraindications noted. Time out completed prior to procedure.    Vital Signs  Vitals:    01/16/25 0955   BP: 119/67   Pulse: 91   Resp: 15   Temp:    SpO2: 98%    (vital signs in table format)      Allergies  Patient has no known allergies. (allergies)    Labs  Lab Results   Component Value Date    INR 1.13 01/16/2025    PROTIME 14.7 01/16/2025     Lab Results   Component Value Date    CREATININE 1.4 (H) 01/16/2025    BUN 26

## 2025-01-16 NOTE — OR NURSING
Image guided liver biopsy biopsy completed by Dr. Rain. Pt tolerated procedure without any signs or symptoms of distress. Vital signs stable. Report given  to \[facility]\"" RN. Pt transported back to \[facility]\"" in stable condition via stretcher.     Total Biopsy: 2  Received: Versed: 0.5 mg       Fentanyl: 50 mcg  Bandage to Mid-abdomen that is clean dry and intact.     Vital Signs  Vitals:    01/16/25 0955   BP: 119/67   Pulse: 91   Resp: 15   Temp:    SpO2: 98%    (vital signs in table format)    Post Bony  2 - Able to move 4 extremities voluntarily on command  2 - BP+/- 20mmHg of normal  2 - Fully awake  2 - Able to maintain oxygen saturation >92% on room air  2 - Able to breathe deeply and cough freely

## 2025-01-20 SDOH — ECONOMIC STABILITY: TRANSPORTATION INSECURITY
IN THE PAST 12 MONTHS, HAS THE LACK OF TRANSPORTATION KEPT YOU FROM MEDICAL APPOINTMENTS OR FROM GETTING MEDICATIONS?: NO

## 2025-01-20 SDOH — ECONOMIC STABILITY: INCOME INSECURITY: IN THE LAST 12 MONTHS, WAS THERE A TIME WHEN YOU WERE NOT ABLE TO PAY THE MORTGAGE OR RENT ON TIME?: NO

## 2025-01-20 SDOH — ECONOMIC STABILITY: FOOD INSECURITY: WITHIN THE PAST 12 MONTHS, THE FOOD YOU BOUGHT JUST DIDN'T LAST AND YOU DIDN'T HAVE MONEY TO GET MORE.: NEVER TRUE

## 2025-01-20 SDOH — ECONOMIC STABILITY: FOOD INSECURITY: WITHIN THE PAST 12 MONTHS, YOU WORRIED THAT YOUR FOOD WOULD RUN OUT BEFORE YOU GOT MONEY TO BUY MORE.: NEVER TRUE

## 2025-01-23 ENCOUNTER — OFFICE VISIT (OUTPATIENT)
Dept: FAMILY MEDICINE CLINIC | Age: 65
End: 2025-01-23
Payer: COMMERCIAL

## 2025-01-23 VITALS
OXYGEN SATURATION: 97 % | HEIGHT: 64 IN | BODY MASS INDEX: 32.91 KG/M2 | WEIGHT: 192.8 LBS | SYSTOLIC BLOOD PRESSURE: 90 MMHG | TEMPERATURE: 98 F | DIASTOLIC BLOOD PRESSURE: 60 MMHG | HEART RATE: 60 BPM

## 2025-01-23 DIAGNOSIS — C22.0 HEPATOCELLULAR CARCINOMA (HCC): Primary | ICD-10-CM

## 2025-01-23 DIAGNOSIS — I81 PVT (PORTAL VEIN THROMBOSIS): ICD-10-CM

## 2025-01-23 DIAGNOSIS — R18.8 CIRRHOSIS OF LIVER WITH ASCITES, UNSPECIFIED HEPATIC CIRRHOSIS TYPE (HCC): ICD-10-CM

## 2025-01-23 DIAGNOSIS — K74.60 CIRRHOSIS OF LIVER WITH ASCITES, UNSPECIFIED HEPATIC CIRRHOSIS TYPE (HCC): ICD-10-CM

## 2025-01-23 DIAGNOSIS — K76.6 PORTAL VENOUS HYPERTENSION (HCC): ICD-10-CM

## 2025-01-23 PROCEDURE — 99214 OFFICE O/P EST MOD 30 MIN: CPT | Performed by: FAMILY MEDICINE

## 2025-01-23 RX ORDER — FUROSEMIDE 40 MG/1
40 TABLET ORAL 2 TIMES DAILY
COMMUNITY
Start: 2025-01-23

## 2025-01-23 ASSESSMENT — PATIENT HEALTH QUESTIONNAIRE - PHQ9
SUM OF ALL RESPONSES TO PHQ QUESTIONS 1-9: 4
9. THOUGHTS THAT YOU WOULD BE BETTER OFF DEAD, OR OF HURTING YOURSELF: NOT AT ALL
10. IF YOU CHECKED OFF ANY PROBLEMS, HOW DIFFICULT HAVE THESE PROBLEMS MADE IT FOR YOU TO DO YOUR WORK, TAKE CARE OF THINGS AT HOME, OR GET ALONG WITH OTHER PEOPLE: SOMEWHAT DIFFICULT
5. POOR APPETITE OR OVEREATING: MORE THAN HALF THE DAYS
SUM OF ALL RESPONSES TO PHQ QUESTIONS 1-9: 4
SUM OF ALL RESPONSES TO PHQ9 QUESTIONS 1 & 2: 1
2. FEELING DOWN, DEPRESSED OR HOPELESS: NOT AT ALL
3. TROUBLE FALLING OR STAYING ASLEEP: NOT AT ALL
7. TROUBLE CONCENTRATING ON THINGS, SUCH AS READING THE NEWSPAPER OR WATCHING TELEVISION: NOT AT ALL
1. LITTLE INTEREST OR PLEASURE IN DOING THINGS: SEVERAL DAYS
8. MOVING OR SPEAKING SO SLOWLY THAT OTHER PEOPLE COULD HAVE NOTICED. OR THE OPPOSITE, BEING SO FIGETY OR RESTLESS THAT YOU HAVE BEEN MOVING AROUND A LOT MORE THAN USUAL: NOT AT ALL
SUM OF ALL RESPONSES TO PHQ QUESTIONS 1-9: 4
SUM OF ALL RESPONSES TO PHQ QUESTIONS 1-9: 4
4. FEELING TIRED OR HAVING LITTLE ENERGY: SEVERAL DAYS

## 2025-01-23 ASSESSMENT — ANXIETY QUESTIONNAIRES
5. BEING SO RESTLESS THAT IT IS HARD TO SIT STILL: SEVERAL DAYS
GAD7 TOTAL SCORE: 3
IF YOU CHECKED OFF ANY PROBLEMS ON THIS QUESTIONNAIRE, HOW DIFFICULT HAVE THESE PROBLEMS MADE IT FOR YOU TO DO YOUR WORK, TAKE CARE OF THINGS AT HOME, OR GET ALONG WITH OTHER PEOPLE: SOMEWHAT DIFFICULT
1. FEELING NERVOUS, ANXIOUS, OR ON EDGE: SEVERAL DAYS
2. NOT BEING ABLE TO STOP OR CONTROL WORRYING: NOT AT ALL
6. BECOMING EASILY ANNOYED OR IRRITABLE: NOT AT ALL
7. FEELING AFRAID AS IF SOMETHING AWFUL MIGHT HAPPEN: NOT AT ALL
3. WORRYING TOO MUCH ABOUT DIFFERENT THINGS: NOT AT ALL
4. TROUBLE RELAXING: SEVERAL DAYS

## 2025-01-23 NOTE — PROGRESS NOTES
General: He is not in acute distress.  HENT:      Head: Normocephalic and atraumatic.      Mouth/Throat:      Mouth: Mucous membranes are moist.      Pharynx: No oropharyngeal exudate.   Eyes:      General: Scleral icterus present.      Extraocular Movements: Extraocular movements intact.      Pupils: Pupils are equal, round, and reactive to light.   Cardiovascular:      Rate and Rhythm: Normal rate and regular rhythm.      Pulses: Normal pulses.      Heart sounds: No murmur heard.  Pulmonary:      Effort: Pulmonary effort is normal. No respiratory distress.      Breath sounds: No wheezing, rhonchi or rales.   Abdominal:      General: Bowel sounds are normal. There is distension (improved from last exam).      Tenderness: There is no abdominal tenderness. There is no guarding or rebound.   Musculoskeletal:      Comments: 1-2+ pitting edema to bilateral LEs   Skin:     General: Skin is warm and dry.      Capillary Refill: Capillary refill takes less than 2 seconds.      Findings: No rash.   Neurological:      General: No focal deficit present.      Mental Status: He is alert. Mental status is at baseline.        No results found for this or any previous visit (from the past 24 hour(s)).       Subjective   Past Medical History:   Diagnosis Date    Cancer (HCC)     Combined hyperlipidemia     MCKEON (nonalcoholic steatohepatitis)     bx proven,neg serology     MCKEON (nonalcoholic steatohepatitis)     SLE (systemic lupus erythematosus) (HCC)     1980 flared w arthralgias pleuritis     Systemic lupus (HCC)      Past Surgical History:   Procedure Laterality Date    IR BIOPSY LIVER PERCUTANEOUS  1/16/2025    IR BIOPSY LIVER PERCUTANEOUS 1/16/2025 [facility] PROCEDURES     Social History     Tobacco Use    Smoking status: Never    Smokeless tobacco: Never   Vaping Use    Vaping status: Never Used   Substance Use Topics    Alcohol use: Yes     Alcohol/week: 1.0 standard drink of alcohol     Types: 1 Cans of beer per week

## 2025-01-30 ENCOUNTER — TELEPHONE (OUTPATIENT)
Dept: INTERVENTIONAL RADIOLOGY/VASCULAR | Age: 65
End: 2025-01-30

## 2025-01-30 NOTE — TELEPHONE ENCOUNTER
Called and spoke to Slade about upcoming procedure. Phone number used: [phone]  Procedure: para w/ albumin  Approving Radiologist: not needed    Pt informed of the following:  Date of procedure: 2/4/25  Arrival time of procedure: 1230  Time of procedure: 1300      Need SDS: Yes

## 2025-01-30 NOTE — TELEPHONE ENCOUNTER
Attempted to call patient to schedule procedure. No answer left message for patient to call back. Number used [phone]    Procedure:  para  Approving Radiologist: not needed

## 2025-02-04 ENCOUNTER — HOSPITAL ENCOUNTER (INPATIENT)
Age: 65
LOS: 1 days | DRG: 441 | End: 2025-02-05
Attending: EMERGENCY MEDICINE | Admitting: INTERNAL MEDICINE
Payer: COMMERCIAL

## 2025-02-04 ENCOUNTER — APPOINTMENT (OUTPATIENT)
Dept: CT IMAGING | Age: 65
DRG: 441 | End: 2025-02-04
Payer: COMMERCIAL

## 2025-02-04 ENCOUNTER — APPOINTMENT (OUTPATIENT)
Dept: GENERAL RADIOLOGY | Age: 65
DRG: 441 | End: 2025-02-04
Payer: COMMERCIAL

## 2025-02-04 ENCOUNTER — APPOINTMENT (OUTPATIENT)
Dept: ULTRASOUND IMAGING | Age: 65
End: 2025-02-04
Attending: INTERNAL MEDICINE
Payer: COMMERCIAL

## 2025-02-04 DIAGNOSIS — K72.00 ACUTE LIVER FAILURE WITHOUT HEPATIC COMA: ICD-10-CM

## 2025-02-04 DIAGNOSIS — N18.9 ACUTE RENAL FAILURE SUPERIMPOSED ON CHRONIC KIDNEY DISEASE, UNSPECIFIED ACUTE RENAL FAILURE TYPE, UNSPECIFIED CKD STAGE (HCC): ICD-10-CM

## 2025-02-04 DIAGNOSIS — N17.9 ACUTE RENAL FAILURE SUPERIMPOSED ON CHRONIC KIDNEY DISEASE, UNSPECIFIED ACUTE RENAL FAILURE TYPE, UNSPECIFIED CKD STAGE (HCC): ICD-10-CM

## 2025-02-04 DIAGNOSIS — E16.2 HYPOGLYCEMIA: ICD-10-CM

## 2025-02-04 DIAGNOSIS — R62.7 FAILURE TO THRIVE IN ADULT: ICD-10-CM

## 2025-02-04 DIAGNOSIS — E87.5 HYPERKALEMIA: Primary | ICD-10-CM

## 2025-02-04 PROBLEM — K72.90 LIVER FAILURE WITHOUT HEPATIC COMA (HCC): Status: ACTIVE | Noted: 2025-02-04

## 2025-02-04 LAB
ALBUMIN FLD-MCNC: 0.7 G/DL
ALBUMIN SERPL-MCNC: 2.4 G/DL (ref 3.4–5)
ALBUMIN/GLOB SERPL: 0.6 {RATIO} (ref 1.1–2.2)
ALP SERPL-CCNC: 845 U/L (ref 40–129)
ALT SERPL-CCNC: 301 U/L (ref 10–40)
AMMONIA PLAS-SCNC: 18 UMOL/L (ref 16–60)
ANION GAP SERPL CALCULATED.3IONS-SCNC: 19 MMOL/L (ref 3–16)
ANISOCYTOSIS BLD QL SMEAR: ABNORMAL
APPEARANCE FLUID: CLEAR
AST SERPL-CCNC: >7000 U/L (ref 15–37)
BASE EXCESS BLDV CALC-SCNC: -6.4 MMOL/L (ref -3–3)
BASOPHILS # BLD: 0 K/UL (ref 0–0.2)
BASOPHILS NFR BLD: 0 %
BDY FLUID QUALITY: NORMAL
BILIRUB SERPL-MCNC: 19.8 MG/DL (ref 0–1)
BUN SERPL-MCNC: 106 MG/DL (ref 7–20)
CALCIUM SERPL-MCNC: 9.9 MG/DL (ref 8.3–10.6)
CELL COUNT FLUID TYPE: NORMAL
CHLORIDE SERPL-SCNC: 87 MMOL/L (ref 99–110)
CK SERPL-CCNC: 294 U/L (ref 39–308)
CO2 BLDV-SCNC: 19 MMOL/L
CO2 SERPL-SCNC: 16 MMOL/L (ref 21–32)
COHGB MFR BLDV: 3.1 % (ref 0–1.5)
COLOR FLUID: YELLOW
CREAT SERPL-MCNC: 4.5 MG/DL (ref 0.8–1.3)
DEPRECATED RDW RBC AUTO: 19.8 % (ref 12.4–15.4)
EKG ATRIAL RATE: 87 BPM
EKG DIAGNOSIS: NORMAL
EKG P AXIS: 84 DEGREES
EKG P-R INTERVAL: 162 MS
EKG Q-T INTERVAL: 368 MS
EKG QRS DURATION: 92 MS
EKG QTC CALCULATION (BAZETT): 442 MS
EKG R AXIS: 44 DEGREES
EKG T AXIS: 42 DEGREES
EKG VENTRICULAR RATE: 87 BPM
EOSINOPHIL # BLD: 0 K/UL (ref 0–0.6)
EOSINOPHIL NFR BLD: 0 %
ETHANOLAMINE SERPL-MCNC: NORMAL MG/DL (ref 0–0.08)
GFR SERPLBLD CREATININE-BSD FMLA CKD-EPI: 14 ML/MIN/{1.73_M2}
GLUCOSE BLD-MCNC: 106 MG/DL (ref 70–99)
GLUCOSE BLD-MCNC: 119 MG/DL (ref 70–99)
GLUCOSE BLD-MCNC: 20 MG/DL (ref 70–99)
GLUCOSE BLD-MCNC: 56 MG/DL (ref 70–99)
GLUCOSE BLD-MCNC: 58 MG/DL (ref 70–99)
GLUCOSE BLD-MCNC: 64 MG/DL (ref 70–99)
GLUCOSE BLD-MCNC: 78 MG/DL (ref 70–99)
GLUCOSE BLD-MCNC: 94 MG/DL (ref 70–99)
GLUCOSE SERPL-MCNC: 24 MG/DL (ref 70–99)
HCO3 BLDV-SCNC: 17.6 MMOL/L (ref 23–29)
HCT VFR BLD AUTO: 36.9 % (ref 40.5–52.5)
HGB BLD-MCNC: 12.7 G/DL (ref 13.5–17.5)
INR PPP: 2.05 (ref 0.85–1.15)
LYMPHOCYTES # BLD: 1.1 K/UL (ref 1–5.1)
LYMPHOCYTES NFR BLD: 7 %
LYMPHOCYTES NFR FLD: 37 %
MACROCYTES BLD QL SMEAR: ABNORMAL
MACROPHAGES # FLD: 35 %
MAGNESIUM SERPL-MCNC: 3.15 MG/DL (ref 1.8–2.4)
MCH RBC QN AUTO: 31.4 PG (ref 26–34)
MCHC RBC AUTO-ENTMCNC: 34.4 G/DL (ref 31–36)
MCV RBC AUTO: 91.5 FL (ref 80–100)
MESOTHL CELL NFR FLD: 1 %
METHGB MFR BLDV: 0.6 %
MICROCYTES BLD QL SMEAR: ABNORMAL
MONOCYTES # BLD: 0.9 K/UL (ref 0–1.3)
MONOCYTES NFR BLD: 6 %
MONONUC CELLS NFR BLD MANUAL: 1 %
MYELOCYTES NFR BLD MANUAL: 3 %
NEUTROPHIL, FLUID: 27 %
NEUTROPHILS # BLD: 13.5 K/UL (ref 1.7–7.7)
NEUTROPHILS NFR BLD: 81 %
NEUTS BAND NFR BLD MANUAL: 2 % (ref 0–7)
NUC CELL # FLD: 249 /CUMM
O2 THERAPY: ABNORMAL
OVALOCYTES BLD QL SMEAR: ABNORMAL
PATH INTERP BLD-IMP: YES
PCO2 BLDV: 31.2 MMHG (ref 40–50)
PERFORMED ON: ABNORMAL
PERFORMED ON: NORMAL
PERFORMED ON: NORMAL
PH BLDV: 7.37 [PH] (ref 7.35–7.45)
PLATELET # BLD AUTO: 334 K/UL (ref 135–450)
PLATELET BLD QL SMEAR: ADEQUATE
PMV BLD AUTO: 8.4 FL (ref 5–10.5)
PO2 BLDV: 35.4 MMHG (ref 25–40)
POIKILOCYTOSIS BLD QL SMEAR: ABNORMAL
POTASSIUM SERPL-SCNC: 5.6 MMOL/L (ref 3.5–5.1)
POTASSIUM SERPL-SCNC: 6.1 MMOL/L (ref 3.5–5.1)
PROT FLD-MCNC: 1.4 G/DL
PROT SERPL-MCNC: 6.4 G/DL (ref 6.4–8.2)
PROTHROMBIN TIME: 23.2 SEC (ref 11.9–14.9)
RBC # BLD AUTO: 4.03 M/UL (ref 4.2–5.9)
RBC FLUID: 1400 /CUMM
SAO2 % BLDV: 61 %
SLIDE REVIEW: ABNORMAL
SMUDGE CELLS BLD QL SMEAR: PRESENT
SODIUM SERPL-SCNC: 122 MMOL/L (ref 136–145)
SPECIMEN SOURCE FLD: NORMAL
TARGETS BLD QL SMEAR: ABNORMAL
TOTAL CELLS COUNTED FLD: 100
TOXIC GRANULES BLD QL SMEAR: PRESENT
TROPONIN, HIGH SENSITIVITY: 39 NG/L (ref 0–22)
WBC # BLD AUTO: 15.7 K/UL (ref 4–11)

## 2025-02-04 PROCEDURE — 6360000002 HC RX W HCPCS: Performed by: INTERNAL MEDICINE

## 2025-02-04 PROCEDURE — 82077 ASSAY SPEC XCP UR&BREATH IA: CPT

## 2025-02-04 PROCEDURE — 84132 ASSAY OF SERUM POTASSIUM: CPT

## 2025-02-04 PROCEDURE — 96361 HYDRATE IV INFUSION ADD-ON: CPT

## 2025-02-04 PROCEDURE — 82550 ASSAY OF CK (CPK): CPT

## 2025-02-04 PROCEDURE — 6360000002 HC RX W HCPCS: Performed by: EMERGENCY MEDICINE

## 2025-02-04 PROCEDURE — 87205 SMEAR GRAM STAIN: CPT

## 2025-02-04 PROCEDURE — 93010 ELECTROCARDIOGRAM REPORT: CPT | Performed by: INTERNAL MEDICINE

## 2025-02-04 PROCEDURE — 2500000003 HC RX 250 WO HCPCS: Performed by: EMERGENCY MEDICINE

## 2025-02-04 PROCEDURE — 84484 ASSAY OF TROPONIN QUANT: CPT

## 2025-02-04 PROCEDURE — 70450 CT HEAD/BRAIN W/O DYE: CPT

## 2025-02-04 PROCEDURE — 1200000000 HC SEMI PRIVATE

## 2025-02-04 PROCEDURE — 82042 OTHER SOURCE ALBUMIN QUAN EA: CPT

## 2025-02-04 PROCEDURE — 80053 COMPREHEN METABOLIC PANEL: CPT

## 2025-02-04 PROCEDURE — 6370000000 HC RX 637 (ALT 250 FOR IP): Performed by: INTERNAL MEDICINE

## 2025-02-04 PROCEDURE — 96374 THER/PROPH/DIAG INJ IV PUSH: CPT

## 2025-02-04 PROCEDURE — 96365 THER/PROPH/DIAG IV INF INIT: CPT

## 2025-02-04 PROCEDURE — 88305 TISSUE EXAM BY PATHOLOGIST: CPT

## 2025-02-04 PROCEDURE — 85025 COMPLETE CBC W/AUTO DIFF WBC: CPT

## 2025-02-04 PROCEDURE — 2500000003 HC RX 250 WO HCPCS

## 2025-02-04 PROCEDURE — 82140 ASSAY OF AMMONIA: CPT

## 2025-02-04 PROCEDURE — 99285 EMERGENCY DEPT VISIT HI MDM: CPT

## 2025-02-04 PROCEDURE — 49083 ABD PARACENTESIS W/IMAGING: CPT

## 2025-02-04 PROCEDURE — 87070 CULTURE OTHR SPECIMN AEROBIC: CPT

## 2025-02-04 PROCEDURE — 82803 BLOOD GASES ANY COMBINATION: CPT

## 2025-02-04 PROCEDURE — 74176 CT ABD & PELVIS W/O CONTRAST: CPT

## 2025-02-04 PROCEDURE — 71045 X-RAY EXAM CHEST 1 VIEW: CPT

## 2025-02-04 PROCEDURE — 89051 BODY FLUID CELL COUNT: CPT

## 2025-02-04 PROCEDURE — 96375 TX/PRO/DX INJ NEW DRUG ADDON: CPT

## 2025-02-04 PROCEDURE — 85610 PROTHROMBIN TIME: CPT

## 2025-02-04 PROCEDURE — 93005 ELECTROCARDIOGRAM TRACING: CPT | Performed by: EMERGENCY MEDICINE

## 2025-02-04 PROCEDURE — 2500000003 HC RX 250 WO HCPCS: Performed by: INTERNAL MEDICINE

## 2025-02-04 PROCEDURE — 83735 ASSAY OF MAGNESIUM: CPT

## 2025-02-04 PROCEDURE — 88112 CYTOPATH CELL ENHANCE TECH: CPT

## 2025-02-04 PROCEDURE — 36415 COLL VENOUS BLD VENIPUNCTURE: CPT

## 2025-02-04 PROCEDURE — 0W9G3ZZ DRAINAGE OF PERITONEAL CAVITY, PERCUTANEOUS APPROACH: ICD-10-PCS | Performed by: RADIOLOGY

## 2025-02-04 PROCEDURE — 84157 ASSAY OF PROTEIN OTHER: CPT

## 2025-02-04 PROCEDURE — 2580000003 HC RX 258: Performed by: EMERGENCY MEDICINE

## 2025-02-04 RX ORDER — POLYETHYLENE GLYCOL 3350 17 G/17G
17 POWDER, FOR SOLUTION ORAL DAILY PRN
Status: DISCONTINUED | OUTPATIENT
Start: 2025-02-04 | End: 2025-02-05 | Stop reason: HOSPADM

## 2025-02-04 RX ORDER — DEXTROSE MONOHYDRATE 25 G/50ML
25 INJECTION, SOLUTION INTRAVENOUS ONCE
Status: COMPLETED | OUTPATIENT
Start: 2025-02-04 | End: 2025-02-04

## 2025-02-04 RX ORDER — DEXTROSE MONOHYDRATE 25 G/50ML
INJECTION, SOLUTION INTRAVENOUS
Status: COMPLETED
Start: 2025-02-04 | End: 2025-02-04

## 2025-02-04 RX ORDER — SPIRONOLACTONE 100 MG/1
100 TABLET, FILM COATED ORAL DAILY
Status: DISCONTINUED | OUTPATIENT
Start: 2025-02-05 | End: 2025-02-05 | Stop reason: HOSPADM

## 2025-02-04 RX ORDER — MORPHINE SULFATE 2 MG/ML
1 INJECTION, SOLUTION INTRAMUSCULAR; INTRAVENOUS EVERY 4 HOURS PRN
Status: DISCONTINUED | OUTPATIENT
Start: 2025-02-04 | End: 2025-02-05

## 2025-02-04 RX ORDER — DEXTROSE MONOHYDRATE 25 G/50ML
25 INJECTION, SOLUTION INTRAVENOUS PRN
Status: DISCONTINUED | OUTPATIENT
Start: 2025-02-04 | End: 2025-02-05 | Stop reason: HOSPADM

## 2025-02-04 RX ORDER — CALCIUM GLUCONATE 20 MG/ML
1000 INJECTION, SOLUTION INTRAVENOUS ONCE
Status: COMPLETED | OUTPATIENT
Start: 2025-02-04 | End: 2025-02-04

## 2025-02-04 RX ORDER — ACETAMINOPHEN 650 MG/1
650 SUPPOSITORY RECTAL EVERY 6 HOURS PRN
Status: DISCONTINUED | OUTPATIENT
Start: 2025-02-04 | End: 2025-02-05 | Stop reason: HOSPADM

## 2025-02-04 RX ORDER — ACETAMINOPHEN 325 MG/1
650 TABLET ORAL EVERY 6 HOURS PRN
Status: DISCONTINUED | OUTPATIENT
Start: 2025-02-04 | End: 2025-02-05 | Stop reason: HOSPADM

## 2025-02-04 RX ORDER — LORAZEPAM 2 MG/ML
1 CONCENTRATE ORAL EVERY 4 HOURS PRN
Status: DISCONTINUED | OUTPATIENT
Start: 2025-02-04 | End: 2025-02-05

## 2025-02-04 RX ORDER — ALBUTEROL SULFATE 0.83 MG/ML
10 SOLUTION RESPIRATORY (INHALATION) ONCE
Status: COMPLETED | OUTPATIENT
Start: 2025-02-04 | End: 2025-02-04

## 2025-02-04 RX ORDER — ENOXAPARIN SODIUM 100 MG/ML
40 INJECTION SUBCUTANEOUS DAILY
Status: DISCONTINUED | OUTPATIENT
Start: 2025-02-04 | End: 2025-02-04

## 2025-02-04 RX ORDER — ENOXAPARIN SODIUM 100 MG/ML
30 INJECTION SUBCUTANEOUS DAILY
Status: DISCONTINUED | OUTPATIENT
Start: 2025-02-05 | End: 2025-02-05 | Stop reason: HOSPADM

## 2025-02-04 RX ORDER — MORPHINE SULFATE 2 MG/ML
1 INJECTION, SOLUTION INTRAMUSCULAR; INTRAVENOUS ONCE
Status: COMPLETED | OUTPATIENT
Start: 2025-02-04 | End: 2025-02-04

## 2025-02-04 RX ORDER — ONDANSETRON 2 MG/ML
4 INJECTION INTRAMUSCULAR; INTRAVENOUS EVERY 6 HOURS PRN
Status: DISCONTINUED | OUTPATIENT
Start: 2025-02-04 | End: 2025-02-05 | Stop reason: HOSPADM

## 2025-02-04 RX ORDER — SODIUM CHLORIDE 0.9 % (FLUSH) 0.9 %
5-40 SYRINGE (ML) INJECTION PRN
Status: DISCONTINUED | OUTPATIENT
Start: 2025-02-04 | End: 2025-02-05 | Stop reason: HOSPADM

## 2025-02-04 RX ORDER — SODIUM CHLORIDE 0.9 % (FLUSH) 0.9 %
5-40 SYRINGE (ML) INJECTION EVERY 12 HOURS SCHEDULED
Status: DISCONTINUED | OUTPATIENT
Start: 2025-02-04 | End: 2025-02-05 | Stop reason: HOSPADM

## 2025-02-04 RX ORDER — SODIUM CHLORIDE 9 MG/ML
INJECTION, SOLUTION INTRAVENOUS PRN
Status: DISCONTINUED | OUTPATIENT
Start: 2025-02-04 | End: 2025-02-05 | Stop reason: HOSPADM

## 2025-02-04 RX ORDER — 0.9 % SODIUM CHLORIDE 0.9 %
500 INTRAVENOUS SOLUTION INTRAVENOUS ONCE
Status: COMPLETED | OUTPATIENT
Start: 2025-02-04 | End: 2025-02-04

## 2025-02-04 RX ORDER — ONDANSETRON 2 MG/ML
4 INJECTION INTRAMUSCULAR; INTRAVENOUS ONCE
Status: COMPLETED | OUTPATIENT
Start: 2025-02-04 | End: 2025-02-04

## 2025-02-04 RX ORDER — ONDANSETRON 4 MG/1
4 TABLET, ORALLY DISINTEGRATING ORAL EVERY 8 HOURS PRN
Status: DISCONTINUED | OUTPATIENT
Start: 2025-02-04 | End: 2025-02-05 | Stop reason: HOSPADM

## 2025-02-04 RX ADMIN — DEXTROSE MONOHYDRATE 25 G: 25 INJECTION, SOLUTION INTRAVENOUS at 10:49

## 2025-02-04 RX ADMIN — Medication 1 MG: at 18:21

## 2025-02-04 RX ADMIN — SODIUM CHLORIDE 500 ML: 9 INJECTION, SOLUTION INTRAVENOUS at 11:12

## 2025-02-04 RX ADMIN — DEXTROSE MONOHYDRATE 25 G: 25 INJECTION, SOLUTION INTRAVENOUS at 13:15

## 2025-02-04 RX ADMIN — MORPHINE SULFATE 1 MG: 2 INJECTION, SOLUTION INTRAMUSCULAR; INTRAVENOUS at 20:01

## 2025-02-04 RX ADMIN — MORPHINE SULFATE 1 MG: 2 INJECTION, SOLUTION INTRAMUSCULAR; INTRAVENOUS at 15:58

## 2025-02-04 RX ADMIN — Medication 1 MG: at 22:20

## 2025-02-04 RX ADMIN — SODIUM CHLORIDE, PRESERVATIVE FREE 10 ML: 5 INJECTION INTRAVENOUS at 20:01

## 2025-02-04 RX ADMIN — MORPHINE SULFATE 1 MG: 2 INJECTION, SOLUTION INTRAMUSCULAR; INTRAVENOUS at 12:05

## 2025-02-04 RX ADMIN — ALBUTEROL SULFATE 10 MG: 2.5 SOLUTION RESPIRATORY (INHALATION) at 11:07

## 2025-02-04 RX ADMIN — ONDANSETRON 4 MG: 2 INJECTION, SOLUTION INTRAMUSCULAR; INTRAVENOUS at 12:05

## 2025-02-04 RX ADMIN — SODIUM BICARBONATE 50 MEQ: 84 INJECTION INTRAVENOUS at 11:07

## 2025-02-04 RX ADMIN — CALCIUM GLUCONATE 1000 MG: 20 INJECTION, SOLUTION INTRAVENOUS at 11:07

## 2025-02-04 ASSESSMENT — PAIN - FUNCTIONAL ASSESSMENT: PAIN_FUNCTIONAL_ASSESSMENT: NONE - DENIES PAIN

## 2025-02-04 NOTE — PROCEDURES
PROCEDURE NOTE  Date: 2/4/2025   Name: Geraldo Teresa  YOB: 1960    Procedures  Pt arrived for image guided right Paracentesis. Dr. Rain explained the procedure including the risk and benefits of the procedure. All questions answered. Pt verbalizes understanding of the procedure and states no more questions. Consent confirmed. Vital signs stable. Labs, allergies, medications, and code status reviewed. No contraindications noted. Time out completed prior to procedure.    Vital Signs  Vitals:    02/04/25 1230   BP: 94/65   Pulse:    Resp:    Temp:    SpO2:     (vital signs in table format)      Allergies  Patient has no known allergies. (allergies)    Labs  Lab Results   Component Value Date    INR 2.05 (H) 02/04/2025    PROTIME 23.2 (H) 02/04/2025     Lab Results   Component Value Date    CREATININE 4.5 (H) 02/04/2025     (HH) 02/04/2025     (L) 02/04/2025    K 5.6 (H) 02/04/2025    CL 87 (L) 02/04/2025    CO2 16 (L) 02/04/2025     Lab Results   Component Value Date    WBC 15.7 (H) 02/04/2025    HGB 12.7 (L) 02/04/2025    HCT 36.9 (L) 02/04/2025    MCV 91.5 02/04/2025     02/04/2025

## 2025-02-04 NOTE — CONSULTS
Once Rubens Rivera .8 mL/hr at 02/04/25 1112 500 mL at 02/04/25 1112    calcium gluconate 1,000 mg in sodium chloride 50 mL  1,000 mg IntraVENous Once Rubens Rivera  mL/hr at 02/04/25 1107 1,000 mg at 02/04/25 1107     Current Outpatient Medications   Medication Sig Dispense Refill    furosemide (LASIX) 40 MG tablet Take 1 tablet by mouth 2 times daily      spironolactone (ALDACTONE) 100 MG tablet Take 1 tablet by mouth daily 30 tablet 0       Allergies:  No Known Allergies    Social History:  Social History     Socioeconomic History    Marital status:      Spouse name: Not on file    Number of children: Not on file    Years of education: Not on file    Highest education level: Not on file   Occupational History    Not on file   Tobacco Use    Smoking status: Never    Smokeless tobacco: Never   Vaping Use    Vaping status: Never Used   Substance and Sexual Activity    Alcohol use: Yes     Alcohol/week: 1.0 standard drink of alcohol     Types: 1 Cans of beer per week     Comment: socially    Drug use: No    Sexual activity: Not Currently     Partners: Female   Other Topics Concern    Not on file   Social History Narrative    Not on file     Social Determinants of Health     Financial Resource Strain: Not on file   Food Insecurity: Not on file (1/20/2025)   Transportation Needs: Not on file   Physical Activity: Unknown (12/23/2024)    Exercise Vital Sign     Days of Exercise per Week: Patient declined     Minutes of Exercise per Session: Not on file   Stress: Not on file   Social Connections: Not on file   Intimate Partner Violence: Not on file   Housing Stability: Unknown (1/20/2025)    Housing Stability Vital Sign     Unable to Pay for Housing in the Last Year: Not on file     Number of Times Moved in the Last Year: 0     Homeless in the Last Year: No          Family History:  Family History   Problem Relation Age of Onset    Diabetes Mother     Hypertension Mother     Depression Mother   place and time. Motor skills grossly intact.   SKIN: jaundiced, texture, turgor. appears intact     DATA:    PT/INR:    Recent Labs     02/04/25  0953 01/23/25  1010 01/16/25  0825   INR 2.05* 1.11 1.13     PTT:  No results for input(s): \"APTT\" in the last 720 hours.    CMP:    Recent Labs     02/04/25  0953   *   K 6.1*   CL 87*   CO2 16*   *     Mg:    Recent Labs     02/04/25  0953   MG 3.15*       Lab Results   Component Value Date    CALCIUM 9.9 02/04/2025    PHOS 2.4 (L) 02/26/2015       CBC:    Recent Labs     02/04/25  0953 01/23/25  1010 01/16/25  0825   WBC 15.7* 7.8 7.6   NEUTROABS 13.5* 6.2  --    LYMPHOPCT 7.0 11.0  --    RBC 4.03* 3.98* 3.90*   HGB 12.7* 12.6* 12.4*   HCT 36.9* 36.4* 35.4*   MCV 91.5 91.6 90.7   MCH 31.4 31.8 31.7   MCHC 34.4 34.7 35.0   RDW 19.8* 19.1* 18.3*    337 307        LDH:No results for input(s): \"LDH\" in the last 720 hours.      Radiology Review:  CT HEAD WO CONTRAST  Narrative: EXAMINATION:  CT OF THE HEAD WITHOUT CONTRAST 2/4/2025.    TECHNIQUE:  CT of the head was performed without the administration of  intravenous contrast.  Dose modulation, iterative reconstruction, and/or weight  based adjustment of the mA/kV was utilized to reduce the radiation dose to as  low as reasonably achievable.    COMPARISON: None    HISTORY/INDICATION: Status post fall.    FINDINGS:  BRAIN/VENTRICLES: Mild motion/streak artifact is present on the examination. 4  mm rounded area of intermediate attenuation identified along the superior margin  of the anterior roof of the third ventricle (image 29). Findings suggest  presence of a colloid cyst. Ventricular system is otherwise unremarkable in  appearance. No evidence of mass effect or midline shift. Mild prominence of  sulci overlying convexities of cerebral hemispheres and cerebellum consistent  with mild atrophy. Very minimal low-attenuation within periventricular white  matter is nonspecific however likely the basis of

## 2025-02-04 NOTE — CONSULTS
Palliative Care Initial Note  Palliative Care Admit date:  2/4/25    ACP/palcare referral noted

## 2025-02-04 NOTE — H&P
[facility] Medicine History & Physical    V 1.6    Date of Admission: 2/4/2025    Date of Service:  Pt seen/examined on 2/4/25     [x]Admitted to Inpatient with expected LOS greater than two midnights due to medical therapy.  []Placed in Observation status.    Chief Admission Complaint: Abdominal distention and feels very weak    Presenting Admission History:      64 y.o. male with PMH significant for SLE, chronic liver disease, MCKEON.  Hepatocellular carcinoma diagnosed on liver biopsy from 1/16/2025  He has been having increasing ascites, jaundice over the past 1 to 2 months.  He has been following with GI/gastro [facility] and oncology [facility].  As noted he did go for liver biopsy on 1/16/2025 which unfortunately did confirm diagnosis of hepatocellular carcinoma.    He presented to [facility] with c/o abdominal distention, feeling very weak and ill.  In the ED laboratory data consistent with multiple electrolyte abnormalities.  Including hyperkalemia, hyponatremia and renal failure.  Abdomen quite distended and on clinical exam consistent with ascites.  Likely has hepatorenal syndrome    Discussed with patient in detail and he is aware prognosis is very poor.  He does not want to do hemodialysis, no intubation, no CPR no invasive means of life support.    He is to be admitted, we will have palliative care evaluate him.  He is scheduled to have a paracentesis done to give him some relief.  Will discuss further with him that hospice may be appropriate in this situation          Assessment/Plan:      Current Principal Problem:  Liver failure without hepatic coma (HCC)    Hepatic failure : He does have a history of MCKEON.  Also with biopsy-proven hepatocellular carcinoma.  Treatment options this time may be limited, he has been following with [facility] will ask him to see in consultation for further input      Hepatocellular carcinoma : He did have liver biopsy completed in January/2025  This did  also abnormal arterial hyperenhancement of the portal vein. This represents diffuse tumor thrombus throughout the intrahepatic portal vasculature and also in the extrahepatic main portal vein. On T2-weighted images, there is diffuse abnormal slightly T2 hyperintense signal throughout the left hepatic lobe. There are also a few scattered nonenhancing discrete lesions in the liver which are likely benign cysts. On the 20 minute delay hepatic biliary phase, there is relatively homogeneous Eovist uptake within the background liver and portions involved with tumor. GALLBLADDER AND BILIARY TREE: There is dependent sludge in the gallbladder. No significant gallbladder distention.  No intra- or extrahepatic biliary dilatation. PANCREAS: Normal. SPLEEN: Spleen is enlarged measuring 14.8 cm in craniocaudal dimension. ADRENAL GLANDS: Normal. KIDNEYS AND URETERS: No hydronephrosis.  Symmetrical normal enhancement. LYMPH NODES: Nonspecific mildly enlarged periportal lymph nodes. PERITONEUM/RETROPERITONEUM: Large volume ascites. VESSELS: Aorta is normal in caliber. Findings in the portal vein are described above. IVC is patent. ABDOMINAL WALL: Anasarca. BONES: No suspicious marrow signal abnormality. OTHER FINDINGS: Trace left pleural effusion.     1.  Cirrhosis with extensive nodular arterial enhancement and washout throughout the liver with diffuse involvement of the enlarged left hepatic lobe which is highly suspicious for HCC. 2.  Arterial enhancement throughout the intrahepatic and extrahepatic thrombosed portal vein is consistent with tumor thrombus. 3.  Findings of portal hypertension with splenomegaly and large volume ascites. 4.  Nonspecific mildly enlarged periportal lymph nodes. 5.  Trace left pleural effusion. Anasarca. Electronically signed by Jersey Guy      PCP: Helen Murray DO    Past Medical History:        Diagnosis Date    Cancer (HCC)     Combined hyperlipidemia     MCKEON (nonalcoholic

## 2025-02-04 NOTE — ACP (ADVANCE CARE PLANNING)
Advance Care Planning     General Advance Care Planning (ACP) Conversation    Date of Conversation: 2/4/2025  Conducted with: Patient with Decision Making Capacity  Other persons present: None  Ayo Sandoval    Healthcare Decision Maker:   Primary Decision Maker: Bernadette TeresaEUNICE - Child - [phone]    Secondary Decision Maker: Shayy Torres - Child - [phone]     Today we documented Decision Maker(s) consistent with Legal Next of Kin hierarchy.  Content/Action Overview:  Has ACP document(s) on file - reflects the patient's care preferences  Reviewed DNR/DNI and patient confirms current DNR status - completed forms on file (place new order if needed)      Length of Voluntary ACP Conversation in minutes:  16    JACOB Padilla

## 2025-02-04 NOTE — CONSULTS
Ph: [phone], Fax: [phone]           [facility]               Reason for admission:                 PRATIMA      Brief Summary :     Geraldo Teresa is being seen by nephrology for PRATIMA.  He is known to have cirrhosis of the liver from [place] and decompensation from that and also hepatocellular carcinoma    Interval History and plan:      Seen in room with neighbor  Has significantly distended abdomen and profound icterus   Bilirubin is very high  Has multiple electrolyte abnormality including hyponatremia, hyperkalemia,  Azotemia, metabolic acidosis  Very low glucose on initial presentation    Plan:  He has very poor prognosis and that was discussed with the patient  Discussed about possibility of needing dialysis this hospitalization but he understands that it is futile and he is choosing for palliative care route  He is DNR CCA and daughter is coming  tomorrow with the paperwork  DW Dr Catalan                      Assessment :     Acute Kidney Injury  Creatinine 4.5 at the time of consult  BUN - 106-very high for patient with renal failure  Also hyperkalemic with potassium of 6.1, hyponatremia  , Metabolic acidosis with CO2 of 16 and anion gap of 19 before correction with hypoalbuminemia    Hypotension  BP: ()/(57-85) Pulse:  [87-98]   Better with fluids                  [facility] would like to thank Romeo Catalan MD   for opportunity to serve this patient      Please call with questions at-   24 Hrs Answering service [phone] or  7 am- 5 pm via Perfect serve or cell phone  Dr.Sudhir Laurel MD       HPI :     Geraldo Teresa is a 64 y.o. male presented to   the hospital on 2/4/2025 with abdominal pain ascites, distention, including discoloration of the skin.  He was found to have significant PRATIMA.  He is also on cirrhosis of the liver due to MCKEON and also known to have hepatocellular cancer untreatable.  We are consulted for PRATIMA and related issues  Is already making

## 2025-02-04 NOTE — ACP (ADVANCE CARE PLANNING)
Advance Care Planning     Palliative Team Advance Care Planning (ACP) Conversation    Date of Conversation: 02/04/25    Individuals present for the conversation: Patient with decision making capacity & a close friend, Lori, was present     ACP documents on file prior to discussion:  -Power of  for Healthcare  -Living Will    Previously completed document/s not on file: N/A    Healthcare Decision Maker:    Primary Decision Maker: Bernadette Teresa HCPOA - Child - [phone]    Secondary Decision Maker: Shayy Torres - Child - [phone]     Resuscitation Status: Deferred this discussion at this visit given the gravity of goc discussion.    Documentation Completed:  -No new documents completed.    Palliative Care Initial Note  Palliative Care Admit date:  2/4/25  Reason for c/s:  Liver failure    Advance Directives:  Pt has two sets of HCPOA's in this EMR and the HCPOA from 2023 supercedes the earlier document.  In the 2023 paperwork, pt designated his dtr, Bernadette, as his healthcare proxy and his other dtr, Shayy, as the alternate.      Plan of care/goals:  Met w/ slade and his neighbor and close friend, Lori, in the ED.  They shared that Bernadette is flying out of [place] tomorrow am and is expected here ~1400.   Slade shared some of his medical history w/ writer and was very open as to his feeling that his time is \"growing much shorter than I thought.\"  Given that, writer inquired as to Slade's goals at this point.  He said he wants to \"be comfortable\" and he is wanting to speak w/ consulting provider's to \"see what they think.\"   Writer asked if the option for hospice had been broached and pt said it hadn't but he wasn't opposed, depending on subsequent provider discussions.  He is eager for his paracentesis adding that he felt much better after his last tap in January.  D/w shift RN who said IR is aware of pt and will see him as soon as possible.  Pt c/o discomfort associated w/ ascites.    Social/Spiritual:  Upon  arrival, it appeared that pt and jenny were in deep discussion. Unsure if Slade has had the benefit of d/w dtr, Bernadette, today but it is likely Jenny has updated her.  Slade and Jenny/her spouse have lived in the same condo complex and become very good friends in the last two years.  In review of chart, pt was still working in January; he works @ [employer].   Slade is focused on the pragmatic issue of finances; Bernadette selling his condo, insurance coverage for his medical care.    Plan:  Tentatively planning f/u tomorrow after Bernadette arrives, unless pt wishes to meet sooner.       Reason for consult:  _X_ Advance Care Planning  ___ Transition of Care Planning  _X_ Psychosocial/Spiritual Support  ___ Symptom Management        I spent 55 minutes with the patient and/or surrogate decision maker discussing the patient's wishes and goals.                                                                                                                                                                                                                                                                            Richie Kelley RN

## 2025-02-04 NOTE — ED NOTES
ED GI CONSULT  RE-worsening liver failure - repeat consult  1112-paged [facility]  1114- returned page, transferred to

## 2025-02-04 NOTE — ED NOTES
Critical results called to this RN. POC glucose obtained to confirm reading. ED MD Rivera made aware of glucose and other critical lab values. See MAR for meds given

## 2025-02-04 NOTE — ED NOTES
ED NEPHROLOGY CONSULT  RE-liver failure, renal failure  1052-Paged  through [employer]  1053- returned page, transferred to

## 2025-02-04 NOTE — PROCEDURES
PROCEDURE NOTE  Date: 2/4/2025   Name: Geraldo Teresa  YOB: 1960    Procedures  Image guided right Paracentesis completed.  6 liters of dark maria a fluid colored withdrawn.  Pt tolerated procedure without any signs or symptoms of distress. Vital signs stable.     DISCHARGED:  ADMITTED: Pt transferred back to room ER31. Report called to nurse.     SPECIMEN SENT:  No    Vital Signs  Vitals:    02/04/25 1245   BP: 103/64   Pulse:    Resp:    Temp:    SpO2:     (vital signs in table format)

## 2025-02-04 NOTE — ED NOTES
Geraldo Teresa is a 64 y.o. male admitted for  Principal Problem:    Liver failure without hepatic coma (HCC)  Resolved Problems:    * No resolved hospital problems. *  .   Patient Home via EMS transportation with   Chief Complaint   Patient presents with    Fatigue     Patient presents from home with complaints of weakness. The patient says he can't stand and is sometimes dizzy. He feels his diuretics aren't working anymore. He is currently being treated for non alcoholic liver cirrhosis. He has an appointment with [facility] at 0945 today and another appointment for a paracentesis at 1230.   .  Patient is alert and Person, Place, Time, and Situation  Patient's baseline mobility: Baseline Mobility: Walker  Code Status: DNR-CC   Cardiac Rhythm:       Is patient on baseline Oxygen: no how many Liters:   Abnormal Assessment Findings: DISTENDED ABD.     Isolation: None      NIH Score:    C-SSRS: Risk of Suicide: No Risk  Bedside swallow:        Active LDA's:   Peripheral IV 02/04/25 Left Antecubital (Active)   Site Assessment Clean, dry & intact 02/04/25 0952   Line Status Blood return noted;Flushed;Normal saline locked 02/04/25 0952   Line Care Connections checked and tightened 02/04/25 0952   Phlebitis Assessment No symptoms 02/04/25 0952   Infiltration Assessment 0 02/04/25 0952       Peripheral IV 02/04/25 Right Antecubital (Active)   Site Assessment Clean, dry & intact 02/04/25 1049   Line Status Blood return noted 02/04/25 1049   Line Care Cap changed 02/04/25 1049   Phlebitis Assessment No symptoms 02/04/25 1049     Patient admitted with a shepherd: no   Family/Caregiver Present no Any Concerns: THEY LIVE OUT OF STATE. FLYING IN. MAIN POINT OF CONTACT HERE IS HIS NEIGHBOR, BREA Alexander no  Sitter no         Vitals: MEWS Score: 2    Vitals:    02/04/25 1338 02/04/25 1353 02/04/25 1410 02/04/25 1503   BP: 112/72 110/71 106/69 93/60   Pulse: 100 (!) 103 (!) 106 93   Resp: 14 25 23 16   Temp:       SpO2: 95% 96% 97%

## 2025-02-04 NOTE — ED NOTES
ED HEMATOLOGY/ONCOLOGY CONSULT  RE-liver failure  1054-paged  through [employer]  7933- returned page, transferred to

## 2025-02-04 NOTE — CONSULTS
GASTROENTEROLOGY INPATIENT CONSULTATION        IDENTIFYING DATA/REASON FOR CONSULTATION   PATIENT:  Geraldo Teresa  MRN:  [id number]  ADMIT DATE: 2/4/2025  TIME OF EVALUATION: 2/4/2025 10:30 AM  HOSPITAL STAY:   LOS: 0 days     REASON FOR CONSULTATION:  liver cirrhosis    HISTORY OF PRESENT ILLNESS   Geraldo Teresa is a 64 y.o. male with a PMH of diffuse hepatocellular carcinoma with portal vein possible tumor thrombus on CT and MRI, lupus who presented on 2/4/2025 with increasing abdominal discomfort, distention, jaundice, weakness. He was recently seen by Dr. 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 Patient had a liver biopsy done at [facility] 1/16/25:  Hepatocellular carcinoma, well-differentiated. He was seen by [facility] oncologist Dr. Monet. He has been sick since mid October 2024 with c/o bloating and swelling, abdominal distension. He is on Lasix 40mg twice daily and spironolactone 100 mg daily for history of ascites and anasarca. He had his first paracentesis done at [facility] and drained 2400cc on 1/16/25. Ascitic fluid negative for malignancy.    Upon arrival CT A/P with cirrhotic liver with stigmata of portal hypertension. Heterogenous attenuation of the liver with suspected multiple nodular hypodense lesions concerning for HCC. Large volume ascites.   CBC with white count 15.7, hgb 12.7, hct 36.9%, platelets 334.  PT 23.2/INR 2.05. Mag 3.15. Ammonia 18. Na 122, K 6.1, CO2 16, glucose 24, , Cr 4.5.   LFTs with AST >7,000, , alk phos 845, total bili 19.8.    Prior Endoscopic Evaluations:  Colonoscopy 9/2015 with Dr. Figueroa:  1.  6 colon nodules all small and looked like lymphoid follicles or hyperplastic polyps.  Removed and sent for pathology to assure no adenomatous changes.  2.  Small internal hemorrhoids.    PAST MEDICAL, SURGICAL, FAMILY, and SOCIAL HISTORY  02/04/25 0923 02/04/25 0924   BP:  91/64   Pulse:  93   Resp:  16   Temp:  97.7 °F (36.5 °C)   SpO2:  98%   Height: 1.626 m (5' 4\")        No intake/output data recorded.      Physical Exam:  General appearance: NAD  Eyes: +scleral icterus  Head: Normocephalic, without obvious abnormality  Lungs: clear to auscultation bilaterally, Normal Effort  Heart: regular rate and rhythm, normal S1 and S2, no murmurs or rubs  Abdomen: abdomen distended, non-tender  Extremities: atraumatic, no cyanosis or edema  Skin: warm and dry, + jaundice  Neuro: Grossly intact, A&OX3      LABS AND IMAGING   Laboratory   Recent Labs     02/04/25  0953   WBC 15.7*   HGB 12.7*   HCT 36.9*   MCV 91.5        No results for input(s): \"NA\", \"K\", \"CL\", \"CO2\", \"PHOS\", \"BUN\", \"CREATININE\" in the last 72 hours.    Invalid input(s): \"CA\"  No results for input(s): \"AST\", \"ALT\", \"BILIDIR\", \"BILITOT\", \"ALKPHOS\" in the last 72 hours.    Invalid input(s): \"ALB\"  No results for input(s): \"LIPASE\", \"AMYLASE\" in the last 72 hours.  Recent Labs     02/04/25  0953   PROTIME 23.2*   INR 2.05*       Imaging  XR CHEST PORTABLE   Final Result      Mild bibasilar atelectasis.      Electronically signed by Teofilo Olmos MD      CT HEAD WO CONTRAST    (Results Pending)         ASSESSMENT AND RECOMMENDATIONS   Geraldo Teresa is a 64 y.o. male with a PMH of diffuse hepatocellular carcinoma with portal vein possible tumor thrombus on CT and MRI, lupus who presented on 2/4/2025 with increasing abdominal discomfort, distention, jaundice, weakness. He was recently seen by Dr. 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 Patient had a liver biopsy done at [facility] 1/16/25:  Hepatocellular carcinoma, well-differentiated. He was seen by [facility] oncologist Dr. Monet.    Upon arrival CT A/P with cirrhotic liver with stigmata of portal hypertension.

## 2025-02-04 NOTE — ED NOTES
ED GI CONSULT  RE-[facility]- [facility]  0941-paged [facility]  1006- returned page transferred to

## 2025-02-04 NOTE — CARE COORDINATION
Case Management Assessment  Initial Evaluation    Date/Time of Evaluation: 2/4/2025 11:33 AM  Assessment Completed by: JACOB Padilla    If patient is discharged prior to next notation, then this note serves as note for discharge by case management.    Patient Name: Geraldo Teresa                   YOB: 1960  Diagnosis: No admission diagnoses are documented for this encounter.                   Date / Time: 2/4/2025  9:15 AM    Patient Admission Status: Emergency   Readmission Risk (Low < 19, Mod (19-27), High > 27): No data recorded  Current PCP: Helen Murray, DO  PCP verified by CM? (P) Yes    Chart Reviewed: Yes      History Provided by: (P) Patient  Patient Orientation: (P) Alert and Oriented    Patient Cognition: (P) Alert    Hospitalization in the last 30 days (Readmission):  No    If yes, Readmission Assessment in CM Navigator will be completed.    Advance Directives:      Code Status: Not on file   Patient's Primary Decision Maker is: (P) Named in Scanned ACP Document    Primary Decision Maker: Bernadette TeresaEUNICE - Child - [phone]    Secondary Decision Maker: BrianShayy - Child - [phone]    Discharge Planning:    Patient lives with: (P) Alone Type of Home: (P) House  Primary Care Giver: (P) Self  Patient Support Systems include: (P) Children, Family Members, Friends/Neighbors   Current Financial resources: (P) None  Current community resources: (P) Hospice  Current services prior to admission: (P) None            Current DME:              Type of Home Care services:  (P) None    ADLS  Prior functional level: (P) Independent in ADLs/IADLs  Current functional level: (P) Assistance with the following:, Dressing, Bathing, Toileting, Cooking, Housework, Shopping, Mobility    PT AM-PAC:   /24  OT AM-PAC:   /24    Family can provide assistance at DC: (P) No  Would you like Case Management to discuss the discharge plan with any other family members/significant others, and

## 2025-02-04 NOTE — ED PROVIDER NOTES
EMERGENCY DEPARTMENT ENCOUNTER        Pt Name: Geraldo Teresa  MRN: [id number]  Birthdate 1960  Date of evaluation: 2/4/2025  Provider: Rubens Rivera MD  PCP: Helen Murray DO      CHIEF COMPLAINT       Chief Complaint   Patient presents with    Fatigue     Patient presents from home with complaints of weakness. The patient says he can't stand and is sometimes dizzy. He feels his diuretics aren't working anymore. He is currently being treated for non alcoholic liver cirrhosis. He has an appointment with [facility] at 0945 today and another appointment for a paracentesis at 1230.       HISTORY OFPRESENT ILLNESS   (Location/Symptom, Timing/Onset, Context/Setting, Quality, Duration, Modifying Factors,Severity)  Note limiting factors.     Geraldo Teresa is a 64 y.o. male presenting today due to concern for increased weakness over the past few days along with fatigue.  He also had a recent fall.  He reports hitting his head but did not pass out.  He denies any headache or neck pain.  He has worsening abdominal distention.  He has known hepatocellular carcinoma.  He reports not eating for days and has not been able to urinate recently as well.  At this point, he states he has a DNR but is still having some fight in him but he states he is getting tired.  Due to concern for failure to thrive and known history of cancer and being too weak to make it to his appointment with oncology today, he came to the emergency department for further evaluation.        REVIEW OF SYSTEMS    (2-9 systems for level 4, 10 or more for level 5)     Review of Systems   Constitutional:  Positive for fatigue.   Respiratory:  Positive for shortness of breath.    Cardiovascular:  Negative for chest pain.   Gastrointestinal:  Positive for abdominal distention, abdominal pain and nausea. Negative for vomiting.   Musculoskeletal:  Negative for neck pain.   Neurological:  Negative for syncope and headaches.   Psychiatric/Behavioral:   tests, a time was given to answer questions.    FINAL IMPRESSION      1. Hyperkalemia    2. Hypoglycemia    3. Acute renal failure superimposed on chronic kidney disease, unspecified acute renal failure type, unspecified CKD stage (HCC)    4. Acute liver failure without hepatic coma    5. Failure to thrive in adult          DISPOSITION/PLAN   DISPOSITION Admitted 02/04/2025 12:07:49 PM               PATIENT REFERRED TO:  No follow-up provider specified.    DISCHARGEMEDICATIONS:  Discharge Medication List as of 2/5/2025  7:21 PM          DISCONTINUED MEDICATIONS:  Discharge Medication List as of 2/5/2025  7:21 PM                 (Please note that portions of this note were completed with a voicerecognition program.  Efforts were made to edit the dictations but occasionally words are mis-transcribed.)    Rubens Rivera MD (electronically signed)            Rubens Rivera MD  02/06/25 7505

## 2025-02-04 NOTE — PLAN OF CARE
Problem: Chronic Conditions and Co-morbidities  Goal: Patient's chronic conditions and co-morbidity symptoms are monitored and maintained or improved  Outcome: Progressing  Flowsheets (Taken 2/4/2025 7124)  Care Plan - Patient's Chronic Conditions and Co-Morbidity Symptoms are Monitored and Maintained or Improved:   Monitor and assess patient's chronic conditions and comorbid symptoms for stability, deterioration, or improvement   Collaborate with multidisciplinary team to address chronic and comorbid conditions and prevent exacerbation or deterioration   Update acute care plan with appropriate goals if chronic or comorbid symptoms are exacerbated and prevent overall improvement and discharge

## 2025-02-04 NOTE — PLAN OF CARE
RTC to advance GOC discussion since pt has had the benefit of d/w multiple provider's. However, he is sleeping soundly so did not awaken.  Will return in am.

## 2025-02-04 NOTE — CONSULTS
Consult Placed     Who: dixon leary  Date:2/4/25  Time:1615   Added to treatment team.  Fredy  2/4/25  1615  Electronically signed by Alisha Kenny on 2/4/2025 at 4:16 PM

## 2025-02-04 NOTE — ED NOTES
Patient to CT with tech. Patient to go to IR afterwards and then come back to the ER. IR called and made aware for blood sugar check after CT

## 2025-02-05 VITALS
DIASTOLIC BLOOD PRESSURE: 47 MMHG | BODY MASS INDEX: 33.09 KG/M2 | TEMPERATURE: 97.9 F | SYSTOLIC BLOOD PRESSURE: 63 MMHG | HEIGHT: 64 IN | OXYGEN SATURATION: 91 %

## 2025-02-05 LAB
ALBUMIN SERPL-MCNC: 1.9 G/DL (ref 3.4–5)
ALBUMIN/GLOB SERPL: 0.6 {RATIO} (ref 1.1–2.2)
ALP SERPL-CCNC: 639 U/L (ref 40–129)
ALT SERPL-CCNC: 1476 U/L (ref 10–40)
ANION GAP SERPL CALCULATED.3IONS-SCNC: 15 MMOL/L (ref 3–16)
ANISOCYTOSIS BLD QL SMEAR: ABNORMAL
AST SERPL-CCNC: 4095 U/L (ref 15–37)
BASOPHILS # BLD: 0 K/UL (ref 0–0.2)
BASOPHILS NFR BLD: 0 %
BILIRUB SERPL-MCNC: 16.9 MG/DL (ref 0–1)
BUN SERPL-MCNC: 118 MG/DL (ref 7–20)
CALCIUM SERPL-MCNC: 8.9 MG/DL (ref 8.3–10.6)
CHLORIDE SERPL-SCNC: 90 MMOL/L (ref 99–110)
CO2 SERPL-SCNC: 18 MMOL/L (ref 21–32)
CREAT SERPL-MCNC: 5.1 MG/DL (ref 0.8–1.3)
DEPRECATED RDW RBC AUTO: 20.2 % (ref 12.4–15.4)
EOSINOPHIL # BLD: 0 K/UL (ref 0–0.6)
EOSINOPHIL NFR BLD: 0 %
GFR SERPLBLD CREATININE-BSD FMLA CKD-EPI: 12 ML/MIN/{1.73_M2}
GLUCOSE SERPL-MCNC: 32 MG/DL (ref 70–99)
HCT VFR BLD AUTO: 31.9 % (ref 40.5–52.5)
HGB BLD-MCNC: 11.1 G/DL (ref 13.5–17.5)
LYMPHOCYTES # BLD: 1.7 K/UL (ref 1–5.1)
LYMPHOCYTES NFR BLD: 11 %
MACROCYTES BLD QL SMEAR: ABNORMAL
MCH RBC QN AUTO: 31.6 PG (ref 26–34)
MCHC RBC AUTO-ENTMCNC: 34.7 G/DL (ref 31–36)
MCV RBC AUTO: 91.3 FL (ref 80–100)
MICROCYTES BLD QL SMEAR: ABNORMAL
MONOCYTES # BLD: 1.2 K/UL (ref 0–1.3)
MONOCYTES NFR BLD: 8 %
MYELOCYTES NFR BLD MANUAL: 2 %
NEUTROPHILS # BLD: 12.5 K/UL (ref 1.7–7.7)
NEUTROPHILS NFR BLD: 68 %
NEUTS BAND NFR BLD MANUAL: 11 % (ref 0–7)
OVALOCYTES BLD QL SMEAR: ABNORMAL
PATH INTERP BLD-IMP: NORMAL
PLATELET # BLD AUTO: 264 K/UL (ref 135–450)
PLATELET BLD QL SMEAR: ADEQUATE
PMV BLD AUTO: 8.7 FL (ref 5–10.5)
POTASSIUM SERPL-SCNC: 6.1 MMOL/L (ref 3.5–5.1)
PROT SERPL-MCNC: 5.1 G/DL (ref 6.4–8.2)
RBC # BLD AUTO: 3.5 M/UL (ref 4.2–5.9)
SLIDE REVIEW: ABNORMAL
SODIUM SERPL-SCNC: 123 MMOL/L (ref 136–145)
WBC # BLD AUTO: 15.4 K/UL (ref 4–11)

## 2025-02-05 PROCEDURE — 36415 COLL VENOUS BLD VENIPUNCTURE: CPT

## 2025-02-05 PROCEDURE — 6370000000 HC RX 637 (ALT 250 FOR IP): Performed by: INTERNAL MEDICINE

## 2025-02-05 PROCEDURE — 85025 COMPLETE CBC W/AUTO DIFF WBC: CPT

## 2025-02-05 PROCEDURE — 6360000002 HC RX W HCPCS: Performed by: INTERNAL MEDICINE

## 2025-02-05 PROCEDURE — 80053 COMPREHEN METABOLIC PANEL: CPT

## 2025-02-05 PROCEDURE — 2500000003 HC RX 250 WO HCPCS: Performed by: INTERNAL MEDICINE

## 2025-02-05 RX ORDER — LORAZEPAM 2 MG/ML
1 CONCENTRATE ORAL
Status: DISCONTINUED | OUTPATIENT
Start: 2025-02-05 | End: 2025-02-05 | Stop reason: HOSPADM

## 2025-02-05 RX ORDER — MORPHINE SULFATE 2 MG/ML
1 INJECTION, SOLUTION INTRAMUSCULAR; INTRAVENOUS
Status: DISCONTINUED | OUTPATIENT
Start: 2025-02-05 | End: 2025-02-05 | Stop reason: HOSPADM

## 2025-02-05 RX ORDER — ATROPINE SULFATE 10 MG/ML
1 SOLUTION/ DROPS OPHTHALMIC
Status: DISCONTINUED | OUTPATIENT
Start: 2025-02-05 | End: 2025-02-05 | Stop reason: HOSPADM

## 2025-02-05 RX ADMIN — MORPHINE SULFATE 1 MG: 2 INJECTION, SOLUTION INTRAMUSCULAR; INTRAVENOUS at 13:07

## 2025-02-05 RX ADMIN — Medication 1 MG: at 15:12

## 2025-02-05 RX ADMIN — Medication 1 MG: at 08:20

## 2025-02-05 RX ADMIN — MORPHINE SULFATE 1 MG: 2 INJECTION, SOLUTION INTRAMUSCULAR; INTRAVENOUS at 01:10

## 2025-02-05 RX ADMIN — MORPHINE SULFATE 1 MG: 2 INJECTION, SOLUTION INTRAMUSCULAR; INTRAVENOUS at 09:28

## 2025-02-05 RX ADMIN — Medication 1 MG: at 03:18

## 2025-02-05 RX ADMIN — MORPHINE SULFATE 1 MG: 2 INJECTION, SOLUTION INTRAMUSCULAR; INTRAVENOUS at 05:21

## 2025-02-05 RX ADMIN — Medication 1 MG: at 11:33

## 2025-02-05 RX ADMIN — ATROPINE SULFATE 1 DROP: 10 SOLUTION/ DROPS OPHTHALMIC at 13:50

## 2025-02-05 RX ADMIN — Medication 10 ML: at 13:08

## 2025-02-05 NOTE — CARE COORDINATION
Chart reviewed.  Spoke with nursing, patient actively dying. Daughter has made it to bedside. Patricia Patterson RN

## 2025-02-05 NOTE — PROGRESS NOTES
ONCOLOGY HEMATOLOGY CARE PROGRESS NOTE      SUBJECTIVE:    The patient is barely responsive.  ROS:     I do not believe this is accurate due to his mental status.    OBJECTIVE        Physical    VITALS:  Patient Vitals for the past 24 hrs:   BP Temp Temp src Pulse Resp SpO2 Height   02/05/25 0815 -- -- -- -- 20 -- --   02/05/25 0713 (!) 82/48 -- -- 100 12 94 % --   02/05/25 0500 (!) 77/48 97.9 °F (36.6 °C) Axillary (!) 101 11 95 % --   02/05/25 0104 (!) 76/53 98.1 °F (36.7 °C) Oral (!) 103 10 94 % --   02/04/25 1943 (!) 77/48 98.6 °F (37 °C) Oral (!) 109 21 96 % 1.626 m (5' 4\")   02/04/25 1612 (!) 80/54 97.7 °F (36.5 °C) Oral 99 22 97 % --   02/04/25 1554 (!) 96/58 -- -- (!) 102 16 97 % --   02/04/25 1503 93/60 -- -- 93 16 96 % --   02/04/25 1410 106/69 -- -- (!) 106 23 97 % --   02/04/25 1353 110/71 -- -- (!) 103 25 96 % --   02/04/25 1338 112/72 -- -- 100 14 95 % --   02/04/25 1306 105/67 -- -- 100 16 97 % --   02/04/25 1245 103/64 -- -- -- -- -- --   02/04/25 1230 94/65 -- -- -- -- -- --   02/04/25 1206 102/83 -- -- (!) 105 15 98 % --   02/04/25 1151 124/87 -- -- (!) 103 10 100 % --   02/04/25 1136 131/85 -- -- 99 12 100 % --   02/04/25 1122 129/85 -- -- 98 12 100 % --   02/04/25 1107 111/77 -- -- 93 13 98 % --   02/04/25 1050 (!) 93/57 -- -- 90 18 98 % --   02/04/25 0956 92/60 -- -- 87 13 98 % --   02/04/25 0924 91/64 97.7 °F (36.5 °C) -- 93 16 98 % --   02/04/25 0923 -- -- -- -- -- -- 1.626 m (5' 4\")       24HR INTAKE/OUTPUT:    Intake/Output Summary (Last 24 hours) at 2/5/2025 0833  Last data filed at 2/5/2025 0104  Gross per 24 hour   Intake 810 ml   Output --   Net 810 ml       CONSTITUTIONAL: He is barely arousable.  EYES: pupils equal, round and reactive to light, sclera clear and conjunctiva normal  ENT: Normocephalic, without obvious abnormality, atraumatic  NECK: supple, symmetrical, no jugular venous distension and no carotid bruits   HEMATOLOGIC/LYMPHATIC: no

## 2025-02-05 NOTE — PLAN OF CARE
Palliative Care Progress Note  Palliative Care Admit date:  2/4/25    Advance Directives:  DNR-CC in place    Plan of care/goals:  Pts close friend, lori, is @ [facility] and pt has not been responsive since she arrived this am.  His resp pattern is almost agonal and he may well be imminently dying.  Slade isn't demonstrating indication of discomfort and Lori said he has looked comfortable since she's been here.   Note in the MAR that pt is receiving prn morphine 1mg Q4 hr prn w/ 5 doses in last 24 hrs and lorazepam 1mg Q4 hr prn w/ 4 doees in last 24 hrs.     Only one episode of urinary op doc'd since admission. BP double digits (77/48), HR up to low 100's.  On R/A.    At this point, there are no family members available @ BS to discuss the option of hospice and pts dtr, Bernadette, isn't expected until later this afternoon.  Have d/w Lori and informed her that timing of pts passing may not warant the involvement of hospice.  Lori reports pt more comfortable since his paracentesis yesterday.     Social/Spiritual:   visited last evening per Lori.  Lori and her  have become very close personal friends of pt.  She shared that Slade was incredibly supportive when they lost their son \"just 8 wks ago.\"  She had no idea \"at that time how sick Slade was.\"    Plan:  Comfort measures in what appears to be pts active dying process.       Palliative Performance Scale:   [] 60%  Amb reduced; Sig dz. Can't do hobbies/housework; Intake normal or reduced, Occasional assist; LOC full/confusion   [] 50%  Mainly sit/lie; Extensive disease. Mainly assist, Intake normal or reduced; Occasional assist; LOC full/confusion   [] 40%  Mainly in bed; Extensive disease; Mainly assist; Intake normal or reduced; Occasional assist; LOC full/confusion   [] 30%  Bed bound, Extensive disease; Total care; Intake reduced; LOC full/confusion   [] 20%  Bed bound; Extensive disease; Total care; Intake minimal; Drowsy/coma   [x] 10%  Bed bound;  Extensive disease; Total care; Mouth care only; Drowsy/coma   []  0%   Death       Reason for consult:  ___ Advance Care Planning  _X_ Transition of Care Planning  _X_ Psychosocial/Spiritual Support  ___ Symptom Management                                                                                                                                                                                                                                                                                                                              Richie Kelley RN

## 2025-02-05 NOTE — PROGRESS NOTES
[facility] Medicine Progress Note  V 1.6      Date of Admission: 2/4/2025    Hospital Day: 2      Chief Admission Complaint: Abdominal distention and weakness    Subjective: He is lying in bed, does not appear to be any acute distress.  Not arousal, does not react to verbal stimuli.  He will move a little on tactile stimuli    Presenting Admission History:       64 y.o. male with PMH significant for SLE, chronic liver disease, MCKEON.  Hepatocellular carcinoma diagnosed on liver biopsy from 1/16/2025  He has been having increasing ascites, jaundice over the past 1 to 2 months.  He has been following with GI/gastro health and oncology [facility].  As noted he did go for liver biopsy on 1/16/2025 which unfortunately did confirm diagnosis of hepatocellular carcinoma.     He presented to [facility] with c/o abdominal distention, feeling very weak and ill.  In the ED laboratory data consistent with multiple electrolyte abnormalities.  Including hyperkalemia, hyponatremia and renal failure.  Abdomen quite distended and on clinical exam consistent with ascites.  Likely has hepatorenal syndrome     Discussed with patient in detail and he is aware prognosis is very poor.  He does not want to do hemodialysis, no intubation, no CPR no invasive means of life support.     He was admitted,was seen by  palliative care   He is scheduled to have a paracentesis done to give him some relief.  I did  discuss further with him on 2/4/25  that hospice may be appropriate in this situation and he agreed and wanted comfort measures and hospice       Assessment/Plan:      Current Principal Problem:  Liver failure without hepatic coma (HCC)    Hepatic failure : He does have a history of MCKEON.  Also with biopsy-proven hepatocellular carcinoma.  Treatment options are  limited, and I did discuss with patient on the day of admission when he was alert and oriented.  He did not want any more invasive measures wanted comfort measures only    4.5*  --  5.1*   CALCIUM 9.9  --  8.9   MG 3.15*  --   --      Recent Labs     02/04/25  0953   TROPHS 39*     No results for input(s): \"LABA1C\" in the last 72 hours.  Recent Labs     02/04/25  0953 02/05/25  0457   AST >7,000* 4,095*   * 1,476*   BILITOT 19.8* 16.9*   ALKPHOS 845* 639*     Recent Labs     02/04/25  0953   INR 2.05*       Urine Cultures: No results found for: \"LABURIN\"  Blood Cultures: No results found for: \"BC\"  No results found for: \"BLOODCULT2\"  Organism: No results found for: \"ORG\"      LIAM ZENDEJAS MD

## 2025-02-05 NOTE — PROGRESS NOTES
Death reported to [facility] Coroner office with release. Death reported to [facility], awaiting release referral # [id number] initials FAUSTINO

## 2025-02-05 NOTE — PROGRESS NOTES
Assessment complete as charted.  Pt is a/o with intermittent confusion.   BP 77/48.  Pt requesting pain medication for comfort, see eMAR.  Daughter Bernadette notified of pt's condition.  Pt requesting presence of .  Spiritual care consulted, came to bedside.  Bed locked in low position with SR x 2.  Call light and bedside table within reach.  Electronically signed by Margy Sandhu RN on 2/4/2025 at 9:00 PM

## 2025-02-05 NOTE — PROGRESS NOTES
INPATIENT PROGRESS NOTE        IDENTIFYING DATA/REASON FOR CONSULTATION   PATIENT:  Geraldo Teresa  MRN:  [id number]  ADMIT DATE: 2025  TIME OF EVALUATION: 2025 8:10 AM  HOSPITAL STAY:   LOS: 1 day   CONSULTING PHYSICIAN: Romeo Catalan MD   REASON FOR CONSULTATION: diffuse hepatocellular cancer    Subjective:    Patient seen in follow-up.  Patient's neighbor and friend is at bedside.  Patient is confused and no longer waking up to stimuli.  Appears to be actively dying at this time.    MEDICATIONS   SCHEDULED:  spironolactone, 100 mg, Daily  sodium chloride flush, 5-40 mL, 2 times per day  enoxaparin, 30 mg, Daily      FLUIDS/DRIPS:     sodium chloride       PRNs: dextrose, 25 g, PRN  sodium chloride flush, 5-40 mL, PRN  sodium chloride, , PRN  ondansetron, 4 mg, Q8H PRN   Or  ondansetron, 4 mg, Q6H PRN  polyethylene glycol, 17 g, Daily PRN  acetaminophen, 650 mg, Q6H PRN   Or  acetaminophen, 650 mg, Q6H PRN  morphine, 1 mg, Q4H PRN  LORazepam, 1 mg, Q4H PRN      ALLERGIES:  No Known Allergies      PHYSICAL EXAM   VITALS:  BP (!) 82/48   Pulse 100   Temp 97.9 °F (36.6 °C) (Axillary)   Resp 12   Ht 1.626 m (5' 4\")   SpO2 94%   BMI 33.09 kg/m²   TEMPERATURE:  Current - Temp: 97.9 °F (36.6 °C); Max - Temp  Av °F (36.7 °C)  Min: 97.7 °F (36.5 °C)  Max: 98.6 °F (37 °C)    Physical Exam:  General appearance: NAD  Eyes: +scleral icterus  Head: Normocephalic, without obvious abnormality  Lungs: clear to auscultation bilaterally, Normal Effort  Heart: regular rate and rhythm, normal S1 and S2, no murmurs or rubs  Abdomen: non-tender  Extremities: atraumatic, no cyanosis or edema  Skin: warm and dry, + jaundice  Neuro: Grossly intact    LABS AND IMAGING   Laboratory   Recent Labs     25  0953 25  0458   WBC 15.7* 15.4*   HGB 12.7* 11.1*   HCT 36.9* 31.9*   MCV 91.5 91.3    264     Recent Labs     25  0953 25  1207 25  0457   *  --  123*   K 6.1* 5.6* 6.1*   CL  2/4/2025 with increasing abdominal discomfort, distention, jaundice, weakness. He was recently seen by Dr. 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 Patient had a liver biopsy done at [facility] 1/16/25:  Hepatocellular carcinoma, well-differentiated. He was seen by [facility] oncologist Dr. Monet.     Upon arrival CT A/P with cirrhotic liver with stigmata of portal hypertension. Heterogenous attenuation of the liver with suspected multiple nodular hypodense lesions concerning for HCC. Large volume ascites.   CBC with white count 15.7, hgb 12.7, hct 36.9%, platelets 334.  PT 23.2/INR 2.05. Mag 3.15. Ammonia 18. Na 122, K 6.1, CO2 16, glucose 24, , Cr 4.5.   LFTs with AST >7,000, , alk phos 845, total bili 19.8.       IMPRESSION:     Diffuse hepatocellular carcinoma. With Advanced HCC patient will not be a candidate for liver transplant. Discussed with oncology and patient is currently not a candidate for treatment. Oncology discussed with patient and they are planning for hospice care. Patient is awaiting his daughter's arrival today. MELD 3.0 is 39 on 2/4/25.     MASH cirrhosis, paracentesis ordered. No SBP.      PRATIMA, diuretic on hold. Concern for Hepatorenal syndrome. Nephrology has been consulted.    RECOMMENDATIONS:    -s/p para with 6L removed. No SBP  -Comfort care  -Pain control per primary team  -No further GI work-up planned at this time so we will sign off. Please call us with any questions or concerns.       If you have any questions or need any further information, please feel free to contact us [phone] or reach out via [employer].  Thank you for allowing us to participate in the care of Geraldo Teresa.    The note was completed using Dragon voice recognition transcription. Every effort was made to ensure accuracy; however, inadvertent transcription errors may be present

## 2025-02-05 NOTE — PROGRESS NOTES
02/04/25 2051   Encounter Summary   Encounter Overview/Reason Spiritual/Emotional Needs;Rituals, Rites and Sacraments   Service Provided For Patient   Referral/Consult From Nurse   Support System Children;Friends/neighbors   Last Encounter  02/04/25  ( provided [place] quilt, prayers, [place] (with [place]), Pentecostal prayers, Pentecostal Bible and rosary. Supported [place] w/empathetic listening for emotional/spiritual support. Gave prayer card, scripture notes and Spiritual Health contact card/KM)   Complexity of Encounter Moderate   Begin Time 2019   Crisis   Type Emotional distress   Spiritual/Emotional needs   Type Emotional Distress;Spiritual Distress;Spiritual Support   Rituals, Rites and Sacraments   Type Blessings  ([place] (with [place]) and Pentecostal prayers. Provided Pentecostal Bible, rosary and scripture notes)   Grief, Loss, and Adjustments   Type Life Adjustments;Adjustment to illness;Grief and loss;Other (Comment)   Assessment/Intervention/Outcome   Assessment Coping;Concerns with suffering;Hopeful;Powerlessness;Sad;Calm;Loneliness;Passive;Peaceful;Social isolation   Intervention Active listening;Discussed belief system/Samaritan practices/heena;Discussed illness injury and it’s impact;Explored/Affirmed feelings, thoughts, concerns;Explored Coping Skills/Resources;Prayer (assurance of)/[place];Sustaining Presence/Ministry of presence;Life review/Legacy;Read/Provided Scripture   Outcome Comfort;Connection/Belonging;Coping;Encouraged;Engaged in conversation;Expressed feelings, needs, and concerns;Expressed Gratitude;Peace      Candy Bansal EdD, FRANCY ROSENBERG ([facility])    Thank you for consulting Spiritual Health    If you would like a 's presence for emotional, spiritual, grief or comfort care,   please dial \"0\" and ask for the  on-call to be paged.    For help with Advanced Care Planning, Power of  for Healthcare or Living Will forms, you may also call us

## 2025-02-05 NOTE — PROGRESS NOTES
Pt . Heart rate & respirations 0. Confirmed with two RN, Serene CALIXTO & Leeroy ROCKWELL. Hospitalist notified via perfect serve. Family is at bedside and denying pastoral care at this time.

## 2025-02-05 NOTE — PLAN OF CARE
Problem: Chronic Conditions and Co-morbidities  Goal: Patient's chronic conditions and co-morbidity symptoms are monitored and maintained or improved  2/5/2025 0331 by Margy Sandhu, RN  Outcome: Not Progressing  Flowsheets (Taken 2/4/2025 1955)  Care Plan - Patient's Chronic Conditions and Co-Morbidity Symptoms are Monitored and Maintained or Improved:   Monitor and assess patient's chronic conditions and comorbid symptoms for stability, deterioration, or improvement   Collaborate with multidisciplinary team to address chronic and comorbid conditions and prevent exacerbation or deterioration   Update acute care plan with appropriate goals if chronic or comorbid symptoms are exacerbated and prevent overall improvement and discharge     Problem: Safety - Adult  Goal: Free from fall injury  Outcome: Progressing  Flowsheets (Taken 2/5/2025 0331)  Free From Fall Injury:   Instruct family/caregiver on patient safety   Based on caregiver fall risk screen, instruct family/caregiver to ask for assistance with transferring infant if caregiver noted to have fall risk factors     Problem: Skin/Tissue Integrity  Goal: Skin integrity remains intact  Description: 1.  Monitor for areas of redness and/or skin breakdown  2.  Assess vascular access sites hourly  3.  Every 4-6 hours minimum:  Change oxygen saturation probe site  4.  Every 4-6 hours:  If on nasal continuous positive airway pressure, respiratory therapy assess nares and determine need for appliance change or resting period  Outcome: Progressing  Taken 2/4/2025 1955  Skin Integrity Remains Intact:   Monitor for areas of redness and/or skin breakdown   Assess vascular access sites hourly

## 2025-02-05 NOTE — PROGRESS NOTES
Spiritual Health History and Assessment/Progress Note  [facility]    Grief, Loss, and Adjustments, Emotional distress, End of Life,      Name: Geraldo Teresa MRN: [id number]    Age: 64 y.o.     Sex: male   Language: English   Hinduism: Confucianism   Liver failure without hepatic coma (HCC)     Date: 2/5/2025            Total Time Calculated: 26 min              Spiritual Assessment continued in [facility] C3 TELE/MED SURG/ONC        Referral/Consult From: Other    Encounter Overview/Reason: Grief, Loss, and Adjustments  Service Provided For: Patient, Friend (Friend Lori at bedside.  Daughter on her way from [place].)    Berna, Belief, Meaning:   Patient unable to assess at this time  Family/Friends Other: Friend/neighbor at bedside dealing with her own grief  (Lost her son 8 weeks ago) and complex family dynamics.      Importance and Influence:  Patient unable to assess at this time  Family/Friends Other: Neighbor, Lori, describes patient as berna-filled.      Community:  Patient Other: Neighbor shared the importance and comfort of the community she has shared with the patient.  This was important to the patient and she openly shared the patient's way of being there for others.  \"He was such a good man.\"  Family/Friends feel well-supported. Support system includes: Friends and Extended family    Assessment and Plan of Care:     Patient Interventions include: Other: Patient unresponsive.  Fr. Beth anointed patient yesterday.  Family/Friends Interventions include: Facilitated expression of thoughts and feelings and Affirmed coping skills/support systems    Patient Plan of Care: Other: Continue end of life support  Family/Friends Plan of Care: Other: Continue end of life support.    Electronically signed by ALCIDES Pang on 2/5/2025 at 11:59 AM

## 2025-02-05 NOTE — PROGRESS NOTES
Pt admission assessment completed and charted. Pt a/o. Comfort medications given per MAR. Pt states that he is comfortable at the moment. BP soft and provider aware. Bed in lowest position and wheels locked. Call light within reach. Bedside table within reach. Non-skid socks in place. Pt denies any other needs at this time.  Pt calls out appropriately.

## 2025-02-06 ASSESSMENT — ENCOUNTER SYMPTOMS
SHORTNESS OF BREATH: 1
VOMITING: 0
ABDOMINAL DISTENTION: 1
ABDOMINAL PAIN: 1
NAUSEA: 1

## 2025-02-06 NOTE — DISCHARGE SUMMARY
Hospital Medicine Discharge Summary  V 1.6    Patient: Geraldo Teresa   : 1960     Primary Care Provider: Helen Murray DO  Admitting Provider: Romeo Zendejas MD  Discharge Provider: ROMEO ZENDEJAS MD     Hospital:  [facility]  Admit Date: 2025   Disposition:      Date of Death: 2025  Time of Death: 1640    Immediate Cause of Death: Hepatic failure  Underlying Conditions: Hepatocellular carcinoma  Other Contributing Conditions:     Discharge Diagnoses:    Active Hospital Problems    Diagnosis     Liver failure without hepatic coma (HCC) [K72.90]        Presenting Admission History:     64 y.o. male with PMH significant for SLE, chronic liver disease, MCKEON.  Hepatocellular carcinoma diagnosed on liver biopsy from 2025  He has been having increasing ascites, jaundice over the past 1 to 2 months.  He has been following with GI/gastro health and oncology [place].  As noted he did go for liver biopsy on 2025 which unfortunately did confirm diagnosis of hepatocellular carcinoma.     He presented to [facility] with c/o abdominal distention, feeling very weak and ill.  In the ED laboratory data consistent with multiple electrolyte abnormalities.  Including hyperkalemia, hyponatremia and renal failure.  Abdomen was quite distended and on clinical exam consistent with ascites.  Likely has hepatorenal syndrome     Discussed with patient in detail and he was aware prognosis  very poor.  He does not want to do hemodialysis, no intubation, no CPR no invasive means of life support.     He was admitted,was seen by  palliative care   He is scheduled to have a paracentesis done to give him some relief.  I did  discuss further with him on 25  that hospice may be appropriate in this situation and he agreed and wanted comfort measures and hospice.          Assessment/Plan:    Hepatic failure : He does have a history of MCKEON.  Also with biopsy-proven

## 2025-02-07 LAB
BACTERIA FLD AEROBE CULT: NORMAL
GRAM STN SPEC: NORMAL

## 2025-02-14 NOTE — PROGRESS NOTES
This nurse opened  chart related to a records request fax that I had faxed out to [employer] [facility] at the request of  PCP.  had been seen in office on 25 for diagnoses of Hepatocellular Carcinoma. Once office visit was completed, PCP verbalized records request from [employer] [facility], obtained signature from Mr. Teresa on [facility] Release of Records form to obtain medical records.     Fax received from [employer] [facility] dated for 25 informed this nurse that the  had passed. Chart was opened by this nurse to confirm statement made by [employer] [facility]. Copy of the fax scanned into  chart.